# Patient Record
Sex: FEMALE | NOT HISPANIC OR LATINO | Employment: OTHER | URBAN - METROPOLITAN AREA
[De-identification: names, ages, dates, MRNs, and addresses within clinical notes are randomized per-mention and may not be internally consistent; named-entity substitution may affect disease eponyms.]

---

## 2020-10-05 ENCOUNTER — OFFICE VISIT (OUTPATIENT)
Dept: INTERNAL MEDICINE | Age: 85
End: 2020-10-05

## 2020-10-05 ENCOUNTER — TELEPHONE (OUTPATIENT)
Dept: INTERNAL MEDICINE | Age: 85
End: 2020-10-05

## 2020-10-05 VITALS
HEIGHT: 62 IN | DIASTOLIC BLOOD PRESSURE: 76 MMHG | HEART RATE: 85 BPM | BODY MASS INDEX: 22.1 KG/M2 | OXYGEN SATURATION: 98 % | WEIGHT: 120.1 LBS | SYSTOLIC BLOOD PRESSURE: 122 MMHG

## 2020-10-05 DIAGNOSIS — I48.91 ATRIAL FIBRILLATION, UNSPECIFIED TYPE (HCC): Primary | ICD-10-CM

## 2020-10-05 DIAGNOSIS — Z98.890 H/O CAROTID ENDARTERECTOMY: ICD-10-CM

## 2020-10-05 DIAGNOSIS — M81.0 OSTEOPOROSIS, UNSPECIFIED OSTEOPOROSIS TYPE, UNSPECIFIED PATHOLOGICAL FRACTURE PRESENCE: ICD-10-CM

## 2020-10-05 DIAGNOSIS — Z82.49 FAMILY HISTORY OF HEART DISEASE: ICD-10-CM

## 2020-10-05 DIAGNOSIS — Z83.3 FAMILY HISTORY OF DIABETES MELLITUS: ICD-10-CM

## 2020-10-05 DIAGNOSIS — I65.23 BILATERAL CAROTID ARTERY STENOSIS: ICD-10-CM

## 2020-10-05 PROCEDURE — 99204 OFFICE O/P NEW MOD 45 MIN: CPT | Performed by: NURSE PRACTITIONER

## 2020-10-05 RX ORDER — ASPIRIN 81 MG/1
81 TABLET ORAL DAILY
COMMUNITY
Start: 2020-10-05 | End: 2020-10-05 | Stop reason: ALTCHOICE

## 2020-10-05 RX ORDER — WARFARIN SODIUM 2.5 MG/1
2.5 TABLET ORAL NIGHTLY
Qty: 90 TABLET | Refills: 0 | Status: SHIPPED | OUTPATIENT
Start: 2020-10-05 | End: 2020-11-05 | Stop reason: DRUGHIGH

## 2020-10-05 NOTE — PROGRESS NOTES
[facility]  JOY Allen / 84 y.o. / female  10/05/2020      ASSESSMENT & PLAN:    Problem List Items Addressed This Visit     None      Visit Diagnoses     Atrial fibrillation, unspecified type (CMS/HCC)    -  Primary    Relevant Orders    CBC & Differential    Ambulatory Referral to Cardiology    Family history of heart disease        Relevant Orders    Ambulatory Referral to Cardiology    H/O carotid endarterectomy        Relevant Orders    Lipid Panel With / Chol / HDL Ratio    Ambulatory Referral to Cardiology    US Carotid Bilateral    Osteoporosis, unspecified osteoporosis type, unspecified pathological fracture presence        Relevant Medications    calcium carbonate-vitamin d (Calcium 600+D) 600-400 MG-UNIT per tablet    Other Relevant Orders    DEXA Bone Density Axial    Vitamin D 25 Hydroxy    Family history of diabetes mellitus        Relevant Orders    Comprehensive Metabolic Panel    Hemoglobin A1c    Bilateral carotid artery stenosis        Relevant Orders    Lipid Panel With / Chol / HDL Ratio    US Carotid Bilateral        Orders Placed This Encounter   Procedures   • DEXA Bone Density Axial   • US Carotid Bilateral   • Comprehensive Metabolic Panel   • Hemoglobin A1c   • Lipid Panel With / Chol / HDL Ratio   • Vitamin D 25 Hydroxy   • Ambulatory Referral to Cardiology   • CBC & Differential     New Medications Ordered This Visit   Medications   • calcium carbonate-vitamin d (Calcium 600+D) 600-400 MG-UNIT per tablet     Sig: Take 1 tablet by mouth Daily.     Dispense:          Summary/Discussion:    Patient overall is a poor historian cannot provide much detail to her medical history.  I did attempt to pull records from her previous PCP office in [place], but they are not on the epic system. We will attempt to obtain previous records from PCP and cardiologist.     1. Atrial fibrillation, unspecified type (CMS/HCC)  She had been on warfarin for some time  "for treatment of A. fib, but reports she stopped this on her own.  She has not had any palpitations, shortness of breath, swelling.  Strongly encourage cardiology referral for this in addition to her significantly strong family history of heart disease.  CHADS-2 score of 4, strong recommendation to restart anticoagulation. Will restart Coumadin at 2.5mg daily, INR recheck 1 week.     We did discuss risk factor reduction for stroke as this is a concern of hers.  I did discuss with patient that untreated atrial fib is certainly a strong risk factor and this is why she needs to be on anticoagulation.  We also discussed blood pressure regulation, evaluation for lipid disorders, and genetic risk.     - CBC & Differential  - Ambulatory Referral to Cardiology    2. Family history of heart disease    - Ambulatory Referral to Cardiology    3. H/O carotid endarterectomy    - Lipid Panel With / Chol / HDL Ratio    - Ambulatory Referral to Cardiology  - US Carotid Bilateral    4. Osteoporosis, unspecified osteoporosis type, unspecified pathological fracture presence  Start calcium and vitamin D replacement.  Will check vitamin D level today.  Needs to obtain updated bone density screening.    - DEXA Bone Density Axial  - Vitamin D 25 Hydroxy  - calcium carbonate-vitamin d (Calcium 600+D) 600-400 MG-UNIT per tablet; Take 1 tablet by mouth Daily.  Dispense:      5. Family history of diabetes mellitus    - Comprehensive Metabolic Panel  - Hemoglobin A1c    6. Bilateral carotid artery stenosis    - Lipid Panel With / Chol / HDL Ratio  - US Carotid Bilateral        Return in about 6 weeks (around 11/16/2020) for Next scheduled follow up.    ____________________________________________________________________    VITALS:    Visit Vitals  /76   Pulse 85   Ht 157.5 cm (62\")   Wt 54.5 kg (120 lb 1.6 oz)   SpO2 98%   BMI 21.97 kg/m²       BP Readings from Last 3 Encounters:   10/05/20 122/76     Wt Readings from Last 3 Encounters: " "  10/05/20 54.5 kg (120 lb 1.6 oz)      Body mass index is 21.97 kg/m².    CC: Main reason(s) for today's visit: Osteoporosis (Bone density test wanted ) and Establish Care      HPI:    Patient is a 84 y.o. female who is here to establish care. Recently moved from [place] about 2 weeks ago.     Patient reports previous history of \"irregular heart rhythm \".  She thinks she may have been diagnosed approximately 7 to 10 years ago.  She was seeing a cardiologist in [place] but it is been sometime since she has been seen.  She also reports that she stopped her warfarin on her own approximately 2 to 3 months ago because she ran out of medication and simply did not want to continue taking it.  She has a strong family history of cardiovascular disease.  She denies any personal history of congestive heart failure, CAD.  She does have a history of a left carotid endarterectomy done approximately 2017.  Denies any chest pain, palpitations.  Occasional shortness of breath.  She wants to discuss \"stroke prevention\" today.     She also has history of osteoporosis, unknown last DEXA scan. She thinks that she may have been on medication treatment previously, but stopped it after a few weeks.     She does report some transient \"black cloud\" across her right peripheral visual field at times. No syncope or dizziness.       Patient Care Team:  Melissa Sandoval APRN as PCP - General (Internal Medicine)  ____________________________________________________________________      REVIEW OF SYSTEMS    Review of Systems   Constitutional: Negative for activity change, appetite change and unexpected weight change.   HENT: Negative for tinnitus.    Eyes: Positive for visual disturbance.   Respiratory: Negative for cough, chest tightness and shortness of breath.    Cardiovascular: Negative for chest pain, palpitations and leg swelling.   Neurological: Negative for dizziness, light-headedness, numbness and headaches.       PHYSICAL " EXAMINATION    Physical Exam  Vitals signs and nursing note reviewed.   Constitutional:       General: She is not in acute distress.     Appearance: She is well-developed. She is not ill-appearing.   Cardiovascular:      Rate and Rhythm: Normal rate and regular rhythm.      Heart sounds: Normal heart sounds, S1 normal and S2 normal. No murmur.   Pulmonary:      Effort: Pulmonary effort is normal.      Breath sounds: Normal breath sounds. No decreased breath sounds, wheezing, rhonchi or rales.   Skin:     General: Skin is warm and dry.   Neurological:      Mental Status: She is alert and oriented to person, place, and time.   Psychiatric:         Speech: Speech normal.         Behavior: Behavior normal. Behavior is cooperative.         Thought Content: Thought content normal.         Judgment: Judgment normal.           REVIEWED DATA:    Labs:   No results found for: NA, K, AST, ALT, BUN, CREATININE, EGFRIFNONA, EGFRIFAFRI    No results found for: GLUCOSE, HGBA1C, MICROALBUR    No results found for: LDL, HDL, TRIG, CHOLHDLRATIO    No results found for: TSH, FREET4     No results found for: WBC, HGB, PLT      Imaging:        Medical Tests:        Summary of old records / correspondence / consultant report:        Request outside records:        ______________________________________________________________________    ALLERGIES  Allergies   Allergen Reactions   • Lipitor [Atorvastatin Calcium] Itching        MEDICATIONS  No current outpatient medications on file prior to visit.     No current facility-administered medications on file prior to visit.        PFSH:     The following portions of the patient's history were reviewed and updated as appropriate: Allergies / Current Medications / Past Medical History / Surgical History / Social History / Family History    PROBLEM LIST   There is no problem list on file for this patient.      PAST MEDICAL HISTORY  Past Medical History:   Diagnosis Date   • Atrial fibrillation  (CMS/[facility])    • Atrial fibrillation (CMS/[facility])    • Osteoporosis        SURGICAL HISTORY  Past Surgical History:   Procedure Laterality Date   • CAROTID ENDARTERECTOMY     • HIP SURGERY     • TONSILLECTOMY         SOCIAL HISTORY  Social History     Socioeconomic History   • Marital status: Single     Spouse name: Not on file   • Number of children: Not on file   • Years of education: Not on file   • Highest education level: Not on file   Tobacco Use   • Smoking status: Never Smoker   Substance and Sexual Activity   • Alcohol use: Yes     Frequency: Monthly or less     Drinks per session: 1 or 2     Binge frequency: Never     Comment: may 2 times a year   • Drug use: Defer   • Sexual activity: Defer       FAMILY HISTORY  Family History   Problem Relation Age of Onset   • Heart disease Mother    • Other Mother         low blood sugar   • Heart attack Mother    • Diabetes Father    • Heart attack Father    • Diabetes Brother    • Heart disease Brother    • Heart disease Brother    • Cancer Brother    • Breast cancer Neg Hx    • Colon cancer Neg Hx          **Dragon Disclaimer:   Much of this encounter note is an electronic transcription/translation of spoken language to printed text. The electronic translation of spoken language may permit erroneous, or at times, nonsensical words or phrases to be inadvertently transcribed. Although I have reviewed the note for such errors, some may still exist.

## 2020-10-05 NOTE — TELEPHONE ENCOUNTER
Contact patient.   Needs 1 week lab appt.     Please let her know that I would like to go ahead and restart her back on her warfarin/coumadin now. I am not sure how long it will take to get her into cardiology and I don't want her to wait due to increase risk of blood clots.     I will send in RX for 2.5mg daily every day.   She will need an appointment to have her PT/INR level checked here in 1 week.     DO NOT START ASPIRIN since we are starting her on warfarin.     We also need to know which pharmacy she wants to use so I can send the RX.

## 2020-10-06 ENCOUNTER — TELEPHONE (OUTPATIENT)
Dept: INTERNAL MEDICINE | Age: 85
End: 2020-10-06

## 2020-10-06 DIAGNOSIS — E78.5 HYPERLIPIDEMIA, UNSPECIFIED HYPERLIPIDEMIA TYPE: ICD-10-CM

## 2020-10-06 DIAGNOSIS — Z98.890 H/O CAROTID ENDARTERECTOMY: Primary | ICD-10-CM

## 2020-10-06 LAB
25(OH)D3+25(OH)D2 SERPL-MCNC: 33 NG/ML (ref 30–100)
ALBUMIN SERPL-MCNC: 4.7 G/DL (ref 3.5–5.2)
ALBUMIN/GLOB SERPL: 2.9 G/DL
ALP SERPL-CCNC: 83 U/L (ref 39–117)
ALT SERPL-CCNC: 10 U/L (ref 1–33)
AST SERPL-CCNC: 21 U/L (ref 1–32)
BASOPHILS # BLD AUTO: 0.06 10*3/MM3 (ref 0–0.2)
BASOPHILS NFR BLD AUTO: 0.7 % (ref 0–1.5)
BILIRUB SERPL-MCNC: 0.3 MG/DL (ref 0–1.2)
BUN SERPL-MCNC: 18 MG/DL (ref 8–23)
BUN/CREAT SERPL: 24.7 (ref 7–25)
CALCIUM SERPL-MCNC: 9.4 MG/DL (ref 8.6–10.5)
CHLORIDE SERPL-SCNC: 106 MMOL/L (ref 98–107)
CHOLEST SERPL-MCNC: 242 MG/DL (ref 0–200)
CHOLEST/HDLC SERPL: 2.99 {RATIO}
CO2 SERPL-SCNC: 30.9 MMOL/L (ref 22–29)
CREAT SERPL-MCNC: 0.73 MG/DL (ref 0.57–1)
EOSINOPHIL # BLD AUTO: 0.06 10*3/MM3 (ref 0–0.4)
EOSINOPHIL NFR BLD AUTO: 0.7 % (ref 0.3–6.2)
ERYTHROCYTE [DISTWIDTH] IN BLOOD BY AUTOMATED COUNT: 12.6 % (ref 12.3–15.4)
GLOBULIN SER CALC-MCNC: 1.6 GM/DL
GLUCOSE SERPL-MCNC: 76 MG/DL (ref 65–99)
HBA1C MFR BLD: 5.3 % (ref 4.8–5.6)
HCT VFR BLD AUTO: 42.9 % (ref 34–46.6)
HDLC SERPL-MCNC: 81 MG/DL (ref 40–60)
HGB BLD-MCNC: 14.5 G/DL (ref 12–15.9)
IMM GRANULOCYTES # BLD AUTO: 0.03 10*3/MM3 (ref 0–0.05)
IMM GRANULOCYTES NFR BLD AUTO: 0.3 % (ref 0–0.5)
LDLC SERPL CALC-MCNC: 147 MG/DL (ref 0–100)
LYMPHOCYTES # BLD AUTO: 1.59 10*3/MM3 (ref 0.7–3.1)
LYMPHOCYTES NFR BLD AUTO: 18.2 % (ref 19.6–45.3)
MCH RBC QN AUTO: 32.4 PG (ref 26.6–33)
MCHC RBC AUTO-ENTMCNC: 33.8 G/DL (ref 31.5–35.7)
MCV RBC AUTO: 96 FL (ref 79–97)
MONOCYTES # BLD AUTO: 0.62 10*3/MM3 (ref 0.1–0.9)
MONOCYTES NFR BLD AUTO: 7.1 % (ref 5–12)
NEUTROPHILS # BLD AUTO: 6.38 10*3/MM3 (ref 1.7–7)
NEUTROPHILS NFR BLD AUTO: 73 % (ref 42.7–76)
NRBC BLD AUTO-RTO: 0 /100 WBC (ref 0–0.2)
PLATELET # BLD AUTO: 270 10*3/MM3 (ref 140–450)
POTASSIUM SERPL-SCNC: 4.5 MMOL/L (ref 3.5–5.2)
PROT SERPL-MCNC: 6.3 G/DL (ref 6–8.5)
RBC # BLD AUTO: 4.47 10*6/MM3 (ref 3.77–5.28)
SODIUM SERPL-SCNC: 145 MMOL/L (ref 136–145)
TRIGL SERPL-MCNC: 68 MG/DL (ref 0–150)
VLDLC SERPL CALC-MCNC: 13.6 MG/DL
WBC # BLD AUTO: 8.74 10*3/MM3 (ref 3.4–10.8)

## 2020-10-06 RX ORDER — ROSUVASTATIN CALCIUM 10 MG/1
10 TABLET, COATED ORAL DAILY
Qty: 30 TABLET | Refills: 5 | Status: SHIPPED | OUTPATIENT
Start: 2020-10-06 | End: 2020-11-05 | Stop reason: SINTOL

## 2020-10-06 NOTE — TELEPHONE ENCOUNTER
----- Message from Cari Sanchez MA sent at 10/6/2020  2:05 PM EDT -----  Pt informed of lab results Via phone. Pt would Rx.

## 2020-10-07 ENCOUNTER — TELEPHONE (OUTPATIENT)
Dept: INTERNAL MEDICINE | Age: 85
End: 2020-10-07

## 2020-10-07 NOTE — TELEPHONE ENCOUNTER
Patient's son Edgar stated the pt did not fast for the labs done on 10/5/20. Wanted to know how this would affect the labs that were already completed?

## 2020-10-07 NOTE — TELEPHONE ENCOUNTER
Contact patient.     Non-fasting status does not appear to have much effect on her labs. Her blood sugar was fine.   Usually if patient is not fasting, triglycerides will be elevated. LDL usually not affected as much, so I would not recommend any changes to her medication recommendations.

## 2020-10-14 ENCOUNTER — ANTICOAGULATION VISIT (OUTPATIENT)
Dept: INTERNAL MEDICINE | Age: 85
End: 2020-10-14

## 2020-10-14 DIAGNOSIS — E78.5 HYPERLIPIDEMIA, UNSPECIFIED HYPERLIPIDEMIA TYPE: Primary | ICD-10-CM

## 2020-10-14 DIAGNOSIS — Z98.890 H/O CAROTID ENDARTERECTOMY: ICD-10-CM

## 2020-10-14 LAB — INR PPP: 1.2 (ref 2–3)

## 2020-10-14 PROCEDURE — 93793 ANTICOAG MGMT PT WARFARIN: CPT | Performed by: NURSE PRACTITIONER

## 2020-10-14 PROCEDURE — 85610 PROTHROMBIN TIME: CPT | Performed by: NURSE PRACTITIONER

## 2020-10-14 PROCEDURE — 36416 COLLJ CAPILLARY BLOOD SPEC: CPT | Performed by: NURSE PRACTITIONER

## 2020-10-14 RX ORDER — EZETIMIBE 10 MG/1
10 TABLET ORAL DAILY
Qty: 30 TABLET | Refills: 5 | Status: SHIPPED | OUTPATIENT
Start: 2020-10-14 | End: 2020-10-15

## 2020-10-14 NOTE — TELEPHONE ENCOUNTER
Contact patient.     RX sent for Zetia (ezetimibe) instead. Not as effective as statin at cholesterol reduction, but should be beneficial.

## 2020-10-14 NOTE — TELEPHONE ENCOUNTER
PT IS UNABLE TO TAKE STATINS. SHE HAS A REACTION.  SHE IS REQUESTING THAT YOU CALL SOMETHING ELSE IN. THEY DID NOT  THE RX YOU CALLED IN ALREADY. ANY QUESTIONS PLEASE CALL [phone]

## 2020-10-15 ENCOUNTER — OFFICE VISIT (OUTPATIENT)
Dept: CARDIOLOGY | Facility: CLINIC | Age: 85
End: 2020-10-15

## 2020-10-15 VITALS
DIASTOLIC BLOOD PRESSURE: 82 MMHG | BODY MASS INDEX: 22.08 KG/M2 | HEART RATE: 76 BPM | WEIGHT: 120 LBS | SYSTOLIC BLOOD PRESSURE: 110 MMHG | HEIGHT: 62 IN

## 2020-10-15 DIAGNOSIS — E78.2 MIXED HYPERLIPIDEMIA: ICD-10-CM

## 2020-10-15 DIAGNOSIS — I48.0 PAROXYSMAL ATRIAL FIBRILLATION (HCC): Primary | ICD-10-CM

## 2020-10-15 DIAGNOSIS — I65.22 CAROTID STENOSIS, ASYMPTOMATIC, LEFT: ICD-10-CM

## 2020-10-15 PROCEDURE — 93000 ELECTROCARDIOGRAM COMPLETE: CPT | Performed by: INTERNAL MEDICINE

## 2020-10-15 PROCEDURE — 99204 OFFICE O/P NEW MOD 45 MIN: CPT | Performed by: INTERNAL MEDICINE

## 2020-10-15 NOTE — PROGRESS NOTES
[facility] New Patient Office Note     Encounter Date:10/15/20  Patient:Anatoliy Allen  :1935  MRN:[id number]    Referring Provider: MAYRA Griffith    Consulted for: Atrial fibrillation and PVD    Chief Complaint:   Chief Complaint   Patient presents with   • Atrial Fibrillation     History of Presenting Illness:      Ms. Allen is a 84 y.o. woman with past medical history notable for paroxysmal atrial fibrillation on warfarin, carotid stenoses status post left carotid endarterectomy, mixed hyperlipidemia, and possible history of stroke who presents our office for initial evaluation and to establish with a cardiologist.  Patient recently moved to the area to be closer with her brother from [place].  Unfortunately most of her care has been out in [place] and we do not have records available to us at this moment.  Sounds like patient has a history of paroxysmal atrial fibrillation as well as a history of a carotid endarterectomy.  She had not been taking much in the way of medications that she was wanting to try and do things naturally but unfortunately her cholesterol was noted to be elevated when she established with her primary care physician earlier this month.  She was also restarted on Coumadin appropriately by her primary care physician.  She has been taking the Coumadin and has been doing fairly well without any issues with bleeding.  Sounds like her primary care physician started and is going to manage this.    With regards to her paroxysmal atrial fibrillation did she does have symptoms of palpitations that occur randomly.  They are somewhat infrequent in duration.  They are bothersome when they do occur.  They typically will only last for a few hours maybe once or twice a week.    Fortunately patient does not great with regards to giving vivid details of her past medical history.  Unclear if she has had a stroke or not.  She did have a left carotid endarterectomy per her  report.  From what she can tell me she has not had any other cardiac issues such as a myocardial infarction but this is a major concern for her as all of her family has had some form of a heart attack.  Fortunately she has not having any symptoms of chest discomfort or chest pain.      Review of Systems:  Review of Systems   Constitution: Negative.   HENT: Negative.    Eyes: Negative.    Cardiovascular: Positive for irregular heartbeat.   Respiratory: Negative.    Endocrine: Negative.    Hematologic/Lymphatic: Negative.    Skin: Negative.    Musculoskeletal: Negative.    Gastrointestinal: Negative.    Genitourinary: Negative.    Neurological: Negative.    Psychiatric/Behavioral: Positive for memory loss.   Allergic/Immunologic: Negative.        Prior to Admission medications    Medication Sig Start Date End Date Taking? Authorizing Provider   calcium carbonate-vitamin d (Calcium 600+D) 600-400 MG-UNIT per tablet Take 1 tablet by mouth Daily. 10/5/20   Melissa Sandoval APRN   ezetimibe (Zetia) 10 MG tablet Take 1 tablet by mouth Daily. 10/14/20   Melissa Sandoval APRN   rosuvastatin (Crestor) 10 MG tablet Take 1 tablet by mouth Daily. 10/6/20   Melissa Sandoval APRN   warfarin (Coumadin) 2.5 MG tablet Take 1 tablet by mouth Every Night. 10/5/20   Melissa Sandoval APRN       Allergies   Allergen Reactions   • Lipitor [Atorvastatin Calcium] Itching       Past Medical History:   Diagnosis Date   • Atrial fibrillation (CMS/HCC)    • Atrial fibrillation (CMS/HCC)    • Osteoporosis        Past Surgical History:   Procedure Laterality Date   • CAROTID ENDARTERECTOMY     • HIP SURGERY     • TONSILLECTOMY         Social History     Socioeconomic History   • Marital status: Single     Spouse name: Not on file   • Number of children: Not on file   • Years of education: Not on file   • Highest education level: Not on file   Tobacco Use   • Smoking status: Never Smoker   • Smokeless tobacco: Never Used   Substance and Sexual  "Activity   • Alcohol use: Yes     Frequency: Monthly or less     Drinks per session: 1 or 2     Binge frequency: Never     Comment: may 2 times a year/caffeine use    • Drug use: Defer   • Sexual activity: Defer       Family History   Problem Relation Age of Onset   • Heart disease Mother    • Other Mother         low blood sugar   • Heart attack Mother    • Diabetes Father    • Heart attack Father    • Diabetes Brother    • Heart disease Brother    • Heart disease Brother    • Cancer Brother    • Breast cancer Neg Hx    • Colon cancer Neg Hx        The following portions of the patient's history were reviewed and updated as appropriate: allergies, current medications, past family history, past medical history, past social history, past surgical history and problem list.       Objective:       Vitals:    10/15/20 1304   BP: 110/82   BP Location: Left arm   Patient Position: Sitting   Pulse: 76   Weight: 54.4 kg (120 lb)   Height: 157.5 cm (62\")       Physical Exam:  Constitutional: Well appearing, well developed, no acute distress   HENT: Oropharynx clear and membrane moist  Eyes: Normal conjunctiva, no sclera icterus.  Neck: Supple, no carotid bruit bilaterally.  Cardiovascular: Regular rate and rhythm, No Murmur, No bilateral lower extremity edema.  Pulmonary: Normal respiratory effort, normal lung sounds, no wheezing.  Abdominal: Soft, nontender, no hepatosplenomegaly, liver is non-pulsatile.  Neurological: Alert and orient x 3.   However seems somewhat forgetful regarding her past medical history  Skin: Warm, dry, no ecchymosis, no rash.  Psych: Appropriate mood and affect. Normal judgment and insight.      Lab Results   Component Value Date    BUN 18 10/05/2020    CREATININE 0.73 10/05/2020    EGFRIFNONA 76 10/05/2020    EGFRIFAFRI 92 10/05/2020    BCR 24.7 10/05/2020    K 4.5 10/05/2020    CO2 30.9 (H) 10/05/2020    CALCIUM 9.4 10/05/2020    PROTENTOTREF 6.3 10/05/2020    ALBUMIN 4.70 10/05/2020    LABIL2 2.9 " 10/05/2020    AST 21 10/05/2020    ALT 10 10/05/2020       Lab Results   Component Value Date    WBC 8.74 10/05/2020    HGB 14.5 10/05/2020    HCT 42.9 10/05/2020    MCV 96.0 10/05/2020     10/05/2020       No results found for: CKTOTAL, CKMB, CKMBINDEX, TROPONINI, TROPONINT    Lab Results   Component Value Date    CHLPL 242 (H) 10/05/2020     Lab Results   Component Value Date    TRIG 68 10/05/2020     Lab Results   Component Value Date    HDL 81 (H) 10/05/2020     Lab Results   Component Value Date     (H) 10/05/2020       No results found for: TSH      ECG 12 Lead    Date/Time: 10/15/2020 1:44 PM  Performed by: Raphael Lainez MD  Authorized by: Raphael Lainez MD   Previous ECG: no previous ECG available  Rhythm: sinus rhythm  Q waves: V1 and V2      Clinical impression: non-specific ECG              Assessment:          Diagnosis Plan   1. Paroxysmal atrial fibrillation (CMS/HCC)  Adult Transthoracic Echo Complete W/ Cont if Necessary Per Protocol    Holter Monitor - 24 Hour   2. Carotid stenosis, asymptomatic, left     3. Mixed hyperlipidemia            Plan:       Ms. Allen is a 84 y.o. woman with past medical history notable for paroxysmal atrial fibrillation on warfarin, carotid stenoses status post left carotid endarterectomy, mixed hyperlipidemia, and possible history of stroke who presents our office for initial evaluation and to establish with a cardiologist.  Fortunately patient simply doing fairly well with regards to her cardiac issues but is having some breakthrough palpitations.  I would like to get a Holter monitor just to evaluate for any overt severe arrhythmias and an echocardiogram to assess her heart for any structural normalities which may be contributing.  I need this to be a good starting point for her.  She is already started back on Coumadin which is being managed by her primary care physician.  She is somewhat forgetful and does have what seems to be some  transportation issues and might benefit from consideration of transitioning to a NOAC that she has some concerns over the cost and can continue with Coumadin as long as she is able to get appropriate INR levels.    Paroxysmal atrial fibrillation:  · We will get monitor to assess for any significant arrhythmias  · Follow-up echocardiogram  · Continue Coumadin as directed by primary care physician's office but could consider transitioning to novel oral anticoagulant if having issues maintaining therapeutic INRs    Carotid stenosis status post carotid endarterectomy:  · Continue aspirin  · Continue statin    Mixed hyperlipidemia:  · Continue statin therapy      Follow-up:  6 weeks work on obtaining records from primary cardiologist from before to get a better idea of what her prior work-up has been    Thank you for allowing me to participate in the care of Anatoliy Allen. Feel free to contact me directly with any further questions or concerns.    Raphael Lainez MD  [facility]  10/15/20  13:43 EDT

## 2020-10-20 ENCOUNTER — HOSPITAL ENCOUNTER (OUTPATIENT)
Dept: CARDIOLOGY | Facility: HOSPITAL | Age: 85
Discharge: HOME | End: 2020-10-20
Admitting: NURSE PRACTITIONER

## 2020-10-20 LAB
BH CV XLRA MEAS LEFT DIST CCA EDV: 21.4 CM/SEC
BH CV XLRA MEAS LEFT DIST CCA PSV: 74.1 CM/SEC
BH CV XLRA MEAS LEFT DIST ICA EDV: -22.5 CM/SEC
BH CV XLRA MEAS LEFT DIST ICA PSV: -86.2 CM/SEC
BH CV XLRA MEAS LEFT ICA/CCA RATIO: 1.16
BH CV XLRA MEAS LEFT MID ICA EDV: -23.6 CM/SEC
BH CV XLRA MEAS LEFT MID ICA PSV: -60.9 CM/SEC
BH CV XLRA MEAS LEFT PROX CCA EDV: 19.2 CM/SEC
BH CV XLRA MEAS LEFT PROX CCA PSV: 74.6 CM/SEC
BH CV XLRA MEAS LEFT PROX ECA EDV: 12 CM/SEC
BH CV XLRA MEAS LEFT PROX ECA PSV: 213 CM/SEC
BH CV XLRA MEAS LEFT PROX ICA EDV: -21.4 CM/SEC
BH CV XLRA MEAS LEFT PROX ICA PSV: -75.7 CM/SEC
BH CV XLRA MEAS LEFT PROX SCLA PSV: 118.7 CM/SEC
BH CV XLRA MEAS LEFT VERTEBRAL A EDV: -9.5 CM/SEC
BH CV XLRA MEAS LEFT VERTEBRAL A PSV: -44.8 CM/SEC
BH CV XLRA MEAS RIGHT DIST CCA EDV: 18.1 CM/SEC
BH CV XLRA MEAS RIGHT DIST CCA PSV: 81.2 CM/SEC
BH CV XLRA MEAS RIGHT DIST ICA EDV: -19.7 CM/SEC
BH CV XLRA MEAS RIGHT DIST ICA PSV: -66.8 CM/SEC
BH CV XLRA MEAS RIGHT ICA/CCA RATIO: 0.99
BH CV XLRA MEAS RIGHT MID ICA EDV: -18 CM/SEC
BH CV XLRA MEAS RIGHT MID ICA PSV: -80.1 CM/SEC
BH CV XLRA MEAS RIGHT PROX CCA EDV: 17.6 CM/SEC
BH CV XLRA MEAS RIGHT PROX CCA PSV: 82.9 CM/SEC
BH CV XLRA MEAS RIGHT PROX ECA EDV: -14 CM/SEC
BH CV XLRA MEAS RIGHT PROX ECA PSV: 134 CM/SEC
BH CV XLRA MEAS RIGHT PROX ICA EDV: 27.3 CM/SEC
BH CV XLRA MEAS RIGHT PROX ICA PSV: 80.1 CM/SEC
BH CV XLRA MEAS RIGHT PROX SCLA PSV: 94.6 CM/SEC
BH CV XLRA MEAS RIGHT VERTEBRAL A EDV: -8.8 CM/SEC
BH CV XLRA MEAS RIGHT VERTEBRAL A PSV: -34.2 CM/SEC
LEFT ARM BP: NORMAL MMHG
RIGHT ARM BP: NORMAL MMHG

## 2020-10-20 PROCEDURE — 93880 EXTRACRANIAL BILAT STUDY: CPT

## 2020-10-21 ENCOUNTER — FLU SHOT (OUTPATIENT)
Dept: INTERNAL MEDICINE | Age: 85
End: 2020-10-21

## 2020-10-21 ENCOUNTER — ANTICOAGULATION VISIT (OUTPATIENT)
Dept: INTERNAL MEDICINE | Age: 85
End: 2020-10-21

## 2020-10-21 DIAGNOSIS — Z23 FLU VACCINE NEED: Primary | ICD-10-CM

## 2020-10-21 LAB — INR PPP: 1.5 (ref 2–3)

## 2020-10-21 PROCEDURE — 90694 VACC AIIV4 NO PRSRV 0.5ML IM: CPT | Performed by: NURSE PRACTITIONER

## 2020-10-21 PROCEDURE — G0008 ADMIN INFLUENZA VIRUS VAC: HCPCS | Performed by: NURSE PRACTITIONER

## 2020-10-21 PROCEDURE — 36416 COLLJ CAPILLARY BLOOD SPEC: CPT | Performed by: NURSE PRACTITIONER

## 2020-10-21 PROCEDURE — 85610 PROTHROMBIN TIME: CPT | Performed by: NURSE PRACTITIONER

## 2020-10-28 ENCOUNTER — ANTICOAGULATION VISIT (OUTPATIENT)
Dept: INTERNAL MEDICINE | Age: 85
End: 2020-10-28

## 2020-10-28 DIAGNOSIS — I48.0 PAROXYSMAL ATRIAL FIBRILLATION (HCC): Primary | ICD-10-CM

## 2020-10-28 LAB — INR PPP: 3 (ref 2–3)

## 2020-10-28 PROCEDURE — 36416 COLLJ CAPILLARY BLOOD SPEC: CPT | Performed by: NURSE PRACTITIONER

## 2020-10-28 PROCEDURE — 85610 PROTHROMBIN TIME: CPT | Performed by: NURSE PRACTITIONER

## 2020-10-28 NOTE — PROGRESS NOTES
Pt informed to hold warfain for 24hrs starting tommorow then resume 5mg daily warfarin per dr candelaria then pt/inr check next week as appt 11/04/20

## 2020-11-02 ENCOUNTER — HOSPITAL ENCOUNTER (OUTPATIENT)
Dept: CARDIOLOGY | Facility: HOSPITAL | Age: 85
Discharge: HOME | End: 2020-11-02
Admitting: INTERNAL MEDICINE

## 2020-11-02 DIAGNOSIS — I48.0 PAROXYSMAL ATRIAL FIBRILLATION (HCC): ICD-10-CM

## 2020-11-02 LAB
AORTIC ARCH: 1.7 CM
BH CV ECHO MEAS - ACS: 1.7 CM
BH CV ECHO MEAS - AO MAX PG (FULL): 4 MMHG
BH CV ECHO MEAS - AO MAX PG: 6.6 MMHG
BH CV ECHO MEAS - AO MEAN PG (FULL): 2.2 MMHG
BH CV ECHO MEAS - AO MEAN PG: 3.4 MMHG
BH CV ECHO MEAS - AO V2 MAX: 128 CM/SEC
BH CV ECHO MEAS - AO V2 MEAN: 82.8 CM/SEC
BH CV ECHO MEAS - AO V2 VTI: 35.9 CM
BH CV ECHO MEAS - BSA(HAYCOCK): 1.5 M^2
BH CV ECHO MEAS - BSA: 1.5 M^2
BH CV ECHO MEAS - BZI_BMI: 21.9 KILOGRAMS/M^2
BH CV ECHO MEAS - BZI_METRIC_HEIGHT: 157.5 CM
BH CV ECHO MEAS - BZI_METRIC_WEIGHT: 54.4 KG
BH CV ECHO MEAS - EDV(MOD-SP2): 43 ML
BH CV ECHO MEAS - EDV(MOD-SP4): 35 ML
BH CV ECHO MEAS - EDV(TEICH): 115.8 ML
BH CV ECHO MEAS - EF(CUBED): 86 %
BH CV ECHO MEAS - EF(MOD-BP): 51.6 %
BH CV ECHO MEAS - EF(MOD-SP2): 53.5 %
BH CV ECHO MEAS - EF(MOD-SP4): 51.4 %
BH CV ECHO MEAS - EF(TEICH): 79.3 %
BH CV ECHO MEAS - ESV(MOD-SP2): 20 ML
BH CV ECHO MEAS - ESV(MOD-SP4): 17 ML
BH CV ECHO MEAS - ESV(TEICH): 24 ML
BH CV ECHO MEAS - FS: 48.1 %
BH CV ECHO MEAS - IVS/LVPW: 1.1
BH CV ECHO MEAS - IVSD: 1.1 CM
BH CV ECHO MEAS - LAT PEAK E' VEL: 8.2 CM/SEC
BH CV ECHO MEAS - LV DIASTOLIC VOL/BSA (35-75): 22.7 ML/M^2
BH CV ECHO MEAS - LV MASS(C)D: 188.3 GRAMS
BH CV ECHO MEAS - LV MASS(C)DI: 122.4 GRAMS/M^2
BH CV ECHO MEAS - LV MAX PG: 2.6 MMHG
BH CV ECHO MEAS - LV MEAN PG: 1.2 MMHG
BH CV ECHO MEAS - LV SYSTOLIC VOL/BSA (12-30): 11 ML/M^2
BH CV ECHO MEAS - LV V1 MAX: 80 CM/SEC
BH CV ECHO MEAS - LV V1 MEAN: 48.3 CM/SEC
BH CV ECHO MEAS - LV V1 VTI: 24 CM
BH CV ECHO MEAS - LVIDD: 5 CM
BH CV ECHO MEAS - LVIDS: 2.6 CM
BH CV ECHO MEAS - LVLD AP2: 6.8 CM
BH CV ECHO MEAS - LVLD AP4: 6.4 CM
BH CV ECHO MEAS - LVLS AP2: 5.5 CM
BH CV ECHO MEAS - LVLS AP4: 6.2 CM
BH CV ECHO MEAS - LVPWD: 1 CM
BH CV ECHO MEAS - MED PEAK E' VEL: 7.7 CM/SEC
BH CV ECHO MEAS - MR MAX PG: 40.4 MMHG
BH CV ECHO MEAS - MR MAX VEL: 317.6 CM/SEC
BH CV ECHO MEAS - MV A DUR: 0.14 SEC
BH CV ECHO MEAS - MV A MAX VEL: 99 CM/SEC
BH CV ECHO MEAS - MV DEC SLOPE: 260 CM/SEC^2
BH CV ECHO MEAS - MV DEC TIME: 0.26 SEC
BH CV ECHO MEAS - MV E MAX VEL: 70.3 CM/SEC
BH CV ECHO MEAS - MV E/A: 0.71
BH CV ECHO MEAS - MV MAX PG: 5.7 MMHG
BH CV ECHO MEAS - MV MEAN PG: 2.2 MMHG
BH CV ECHO MEAS - MV P1/2T MAX VEL: 71.8 CM/SEC
BH CV ECHO MEAS - MV P1/2T: 80.9 MSEC
BH CV ECHO MEAS - MV V2 MAX: 119.4 CM/SEC
BH CV ECHO MEAS - MV V2 MEAN: 68.8 CM/SEC
BH CV ECHO MEAS - MV V2 VTI: 26.2 CM
BH CV ECHO MEAS - MVA P1/2T LCG: 3.1 CM^2
BH CV ECHO MEAS - MVA(P1/2T): 2.7 CM^2
BH CV ECHO MEAS - PA MAX PG (FULL): 1 MMHG
BH CV ECHO MEAS - PA MAX PG: 2.3 MMHG
BH CV ECHO MEAS - PA V2 MAX: 76 CM/SEC
BH CV ECHO MEAS - PULM A REVS DUR: 0.11 SEC
BH CV ECHO MEAS - PULM A REVS VEL: 26.1 CM/SEC
BH CV ECHO MEAS - PULM DIAS VEL: 30.4 CM/SEC
BH CV ECHO MEAS - PULM S/D: 1.8
BH CV ECHO MEAS - PULM SYS VEL: 55.3 CM/SEC
BH CV ECHO MEAS - PVA(V,A): 2.6 CM^2
BH CV ECHO MEAS - PVA(V,D): 2.6 CM^2
BH CV ECHO MEAS - RAP SYSTOLE: 8 MMHG
BH CV ECHO MEAS - RV MAX PG: 1.3 MMHG
BH CV ECHO MEAS - RV MEAN PG: 0.81 MMHG
BH CV ECHO MEAS - RV V1 MAX: 57 CM/SEC
BH CV ECHO MEAS - RV V1 MEAN: 42.4 CM/SEC
BH CV ECHO MEAS - RV V1 VTI: 13.3 CM
BH CV ECHO MEAS - RVOT AREA: 3.4 CM^2
BH CV ECHO MEAS - RVOT DIAM: 2.1 CM
BH CV ECHO MEAS - RVSP: 29 MMHG
BH CV ECHO MEAS - SI(CUBED): 68 ML/M^2
BH CV ECHO MEAS - SI(MOD-SP2): 14.9 ML/M^2
BH CV ECHO MEAS - SI(MOD-SP4): 11.7 ML/M^2
BH CV ECHO MEAS - SI(TEICH): 59.7 ML/M^2
BH CV ECHO MEAS - SUP REN AO DIAM: 1.3 CM
BH CV ECHO MEAS - SV(CUBED): 104.6 ML
BH CV ECHO MEAS - SV(MOD-SP2): 23 ML
BH CV ECHO MEAS - SV(MOD-SP4): 18 ML
BH CV ECHO MEAS - SV(RVOT): 45.2 ML
BH CV ECHO MEAS - SV(TEICH): 91.8 ML
BH CV ECHO MEAS - TAPSE (>1.6): 2.8 CM
BH CV ECHO MEAS - TR MAX VEL: 234.1 CM/SEC
BH CV ECHO MEASUREMENTS AVERAGE E/E' RATIO: 8.84
BH CV XLRA - RV BASE: 3.7 CM
BH CV XLRA - RV LENGTH: 6.6 CM
BH CV XLRA - RV MID: 3.2 CM
BH CV XLRA - TDI S': 11.1 CM/SEC
LEFT ATRIUM VOLUME INDEX: 27 ML/M2
MAXIMAL PREDICTED HEART RATE: 137 BPM
SINUS: 3.5 CM
STRESS TARGET HR: 116 BPM

## 2020-11-02 PROCEDURE — 93356 MYOCRD STRAIN IMG SPCKL TRCK: CPT | Performed by: INTERNAL MEDICINE

## 2020-11-02 PROCEDURE — 93306 TTE W/DOPPLER COMPLETE: CPT | Performed by: INTERNAL MEDICINE

## 2020-11-02 PROCEDURE — 93356 MYOCRD STRAIN IMG SPCKL TRCK: CPT

## 2020-11-02 PROCEDURE — 93306 TTE W/DOPPLER COMPLETE: CPT

## 2020-11-05 ENCOUNTER — ANTICOAGULATION VISIT (OUTPATIENT)
Dept: INTERNAL MEDICINE | Age: 85
End: 2020-11-05

## 2020-11-05 ENCOUNTER — OFFICE VISIT (OUTPATIENT)
Dept: INTERNAL MEDICINE | Age: 85
End: 2020-11-05

## 2020-11-05 VITALS
OXYGEN SATURATION: 97 % | SYSTOLIC BLOOD PRESSURE: 110 MMHG | BODY MASS INDEX: 22.26 KG/M2 | HEIGHT: 62 IN | HEART RATE: 83 BPM | TEMPERATURE: 97.7 F | DIASTOLIC BLOOD PRESSURE: 80 MMHG | RESPIRATION RATE: 16 BRPM | WEIGHT: 121 LBS

## 2020-11-05 DIAGNOSIS — T14.8XXA BRUISING: ICD-10-CM

## 2020-11-05 DIAGNOSIS — J02.9 SORE THROAT: ICD-10-CM

## 2020-11-05 DIAGNOSIS — I48.0 PAROXYSMAL ATRIAL FIBRILLATION (HCC): Primary | ICD-10-CM

## 2020-11-05 LAB
INR PPP: 1.9 (ref 0.9–1.1)
INR PPP: 1.9 (ref 2–3)

## 2020-11-05 PROCEDURE — 85610 PROTHROMBIN TIME: CPT | Performed by: NURSE PRACTITIONER

## 2020-11-05 PROCEDURE — 99214 OFFICE O/P EST MOD 30 MIN: CPT | Performed by: NURSE PRACTITIONER

## 2020-11-05 PROCEDURE — 36416 COLLJ CAPILLARY BLOOD SPEC: CPT | Performed by: NURSE PRACTITIONER

## 2020-11-05 RX ORDER — EZETIMIBE 10 MG/1
10 TABLET ORAL DAILY
COMMUNITY
End: 2021-03-09

## 2020-11-05 RX ORDER — FLUTICASONE PROPIONATE 50 MCG
1 SPRAY, SUSPENSION (ML) NASAL DAILY
COMMUNITY
Start: 2020-11-05 | End: 2021-03-04

## 2020-11-05 RX ORDER — WARFARIN SODIUM 5 MG/1
TABLET ORAL
Qty: 90 TABLET | Refills: 1 | Status: SHIPPED | OUTPATIENT
Start: 2020-11-05 | End: 2020-11-19 | Stop reason: ALTCHOICE

## 2020-11-05 NOTE — PROGRESS NOTES
[facility] [place] [facility]NAL MEDICINE  Melissa Trevinoos / 84 y.o. / female  2020      ASSESSMENT & PLAN:    Problem List Items Addressed This Visit        Cardiovascular and Mediastinum    Paroxysmal atrial fibrillation (CMS/HCC) - Primary    Relevant Medications    warfarin (Coumadin) 5 MG tablet      Other Visit Diagnoses     Sore throat        Relevant Medications    fluticasone (Flonase) 50 MCG/ACT nasal spray    Bruising            Orders Placed This Encounter   Procedures   • POC INR     New Medications Ordered This Visit   Medications   • warfarin (Coumadin) 5 MG tablet     Sig: TAKE 5 MG (1 TABLET) ON MON, TUES, WED, FRI, SAT.  TAKE 7.5MG (1.5 TABLETS) ON THURS, SUN     Dispense:  90 tablet     Refill:  1   • fluticasone (Flonase) 50 MCG/ACT nasal spray     Si spray into the nostril(s) as directed by provider Daily.       Summary/Discussion:    1. Paroxysmal atrial fibrillation (CMS/HCC)  INR subtherapeutic at 1.9 today.  Discussed with patient change dosage to 5 mg on Monday, Tuesday, Wednesday, Friday, Saturday and 7.5 mg on Thursday and .  She was advised as to this dosage at her last check, but patient reports she is only been taking 5 mg every day.  Will have her return in one week for INR check.     - warfarin (Coumadin) 5 MG tablet; TAKE 5 MG (1 TABLET) ON MON, TUES, WED, FRI, SAT.  TAKE 7.5MG (1.5 TABLETS) ON THURS, SUN  Dispense: 90 tablet; Refill: 1    2. Sore throat  Suspect that recent intermittent sore throat is likely related to postnasal drainage and/or allergies.  Patient is new to this area from [place] and has not had symptoms like this before.    - fluticasone (Flonase) 50 MCG/ACT nasal spray; 1 spray into the nostril(s) as directed by provider Daily.    3. Bruising  Discussed with patient that small lesions on hands are bruises and related to endocoagulation.  She does not have any other significant bruising.  Platelet count at last office visit was within normal  "range.  Advised her to continue to monitor the specific areas of concern and if they do not resolve in the next few weeks, follow-up as needed.         Return for 1 week PT/INR, 4 month follow up visit .  ____________________________________________________________________    MEDICATIONS  Current Outpatient Medications   Medication Sig Dispense Refill   • calcium carbonate-vitamin d (Calcium 600+D) 600-400 MG-UNIT per tablet Take 1 tablet by mouth Daily.     • ezetimibe (ZETIA) 10 MG tablet Take 10 mg by mouth Daily.     • MAGNESIUM PO Take  by mouth.     • ASPIRIN 81 PO Take  by mouth.     • fluticasone (Flonase) 50 MCG/ACT nasal spray 1 spray into the nostril(s) as directed by provider Daily.     • warfarin (Coumadin) 5 MG tablet TAKE 5 MG (1 TABLET) ON MON, TUES, WED, FRI, SAT.  TAKE 7.5MG (1.5 TABLETS) ON THURS, SUN 90 tablet 1     No current facility-administered medications for this visit.        VITALS    Visit Vitals  /80   Pulse 83   Temp 97.7 °F (36.5 °C)   Resp 16   Ht 157.5 cm (62\")   Wt 54.9 kg (121 lb)   SpO2 97%   BMI 22.13 kg/m²       BP Readings from Last 3 Encounters:   11/05/20 110/80   10/15/20 110/82   10/05/20 122/76     Wt Readings from Last 3 Encounters:   11/05/20 54.9 kg (121 lb)   10/15/20 54.4 kg (120 lb)   10/05/20 54.5 kg (120 lb 1.6 oz)      Body mass index is 22.13 kg/m².    CC:  Main reason(s) for today's visit: Follow-up for hyperlipidemia, INR management , sore throat      HPI:     Chronic hyperlipidemia:  Current therapy include Zetia.    Most recent labs:   Lab Results   Component Value Date     (H) 10/05/2020    HDL 81 (H) 10/05/2020    TRIG 68 10/05/2020    CHOLHDLRATIO 2.99 10/05/2020        Sore Throat: Patient complains of sore throat. Associated symptoms include sore throat.Onset of symptoms was several weeks ago, intermittent since that time. She is drinking plenty of fluids. She has not had recent close exposure to someone with proven streptococcal " "pharyngitis.     She is also concerned about some new \"skin lesions \"to back of left hand.  She has noticed these for the past few weeks.  The areas are purplish in color. She denies any other significant bruising elsewhere on the body.     Patient Care Team:  Melissa Sandoval APRN as PCP - General (Internal Medicine)  ____________________________________________________________________      REVIEW OF SYSTEMS    Review of Systems   Constitutional: Negative for activity change, appetite change and unexpected weight change.   HENT: Positive for postnasal drip and sore throat. Negative for tinnitus.    Eyes: Negative for visual disturbance.   Respiratory: Negative for cough, chest tightness and shortness of breath.    Cardiovascular: Negative for chest pain, palpitations and leg swelling.   Skin: Positive for rash.   Neurological: Negative for dizziness, light-headedness and headaches.         PHYSICAL EXAMINATION    Physical Exam  Vitals signs and nursing note reviewed.   Constitutional:       General: She is not in acute distress.     Appearance: She is well-developed. She is not ill-appearing.   Cardiovascular:      Rate and Rhythm: Normal rate and regular rhythm.      Heart sounds: Normal heart sounds, S1 normal and S2 normal. No murmur.   Pulmonary:      Effort: Pulmonary effort is normal.      Breath sounds: Normal breath sounds. No decreased breath sounds, wheezing, rhonchi or rales.   Skin:     General: Skin is warm and dry.      Comments: Scattered small bruises to dorsal surfaces both hands    Neurological:      Mental Status: She is alert and oriented to person, place, and time.   Psychiatric:         Speech: Speech normal.         Behavior: Behavior normal. Behavior is cooperative.         Thought Content: Thought content normal.         Judgment: Judgment normal.         REVIEWED DATA:    Labs:   Lab Results   Component Value Date     10/05/2020    K 4.5 10/05/2020    AST 21 10/05/2020    ALT 10 " 10/05/2020    BUN 18 10/05/2020    CREATININE 0.73 10/05/2020    EGFRIFNONA 76 10/05/2020    EGFRIFAFRI 92 10/05/2020       Lab Results   Component Value Date    HGBA1C 5.30 10/05/2020       Lab Results   Component Value Date     (H) 10/05/2020    HDL 81 (H) 10/05/2020    TRIG 68 10/05/2020    CHOLHDLRATIO 2.99 10/05/2020       No results found for: TSH, FREET4       Lab Results   Component Value Date    WBC 8.74 10/05/2020    HGB 14.5 10/05/2020     10/05/2020       No results found for: PROTEIN, GLUCOSEU, BLOODU, NITRITEU, LEUKOCYTESUR    Imaging:        Medical Tests:        Summary of old records / correspondence / consultant report:        Request outside records:        ALLERGIES  Allergies   Allergen Reactions   • Lipitor [Atorvastatin Calcium] Itching        PFSH:     The following portions of the patient's history were reviewed and updated as appropriate: Allergies / Current Medications / Past Medical History / Surgical History / Social History / Family History    PROBLEM LIST   Patient Active Problem List   Diagnosis   • Paroxysmal atrial fibrillation (CMS/HCC)   • Carotid stenosis, asymptomatic, left   • Mixed hyperlipidemia       PAST MEDICAL HISTORY  Past Medical History:   Diagnosis Date   • Atrial fibrillation (CMS/HCC)    • Atrial fibrillation (CMS/HCC)    • Osteoporosis        SURGICAL HISTORY  Past Surgical History:   Procedure Laterality Date   • CAROTID ENDARTERECTOMY     • HIP SURGERY     • TONSILLECTOMY         SOCIAL HISTORY  Social History     Socioeconomic History   • Marital status: Single     Spouse name: Not on file   • Number of children: Not on file   • Years of education: Not on file   • Highest education level: Not on file   Tobacco Use   • Smoking status: Never Smoker   • Smokeless tobacco: Never Used   Substance and Sexual Activity   • Alcohol use: Yes     Frequency: Monthly or less     Drinks per session: 1 or 2     Binge frequency: Never     Comment: may 2 times a  year/caffeine use    • Drug use: Defer   • Sexual activity: Defer       FAMILY HISTORY  Family History   Problem Relation Age of Onset   • Heart disease Mother    • Other Mother         low blood sugar   • Heart attack Mother    • Diabetes Father    • Heart attack Father    • Diabetes Brother    • Heart disease Brother    • Heart disease Brother    • Cancer Brother    • Breast cancer Neg Hx    • Colon cancer Neg Hx

## 2020-11-12 ENCOUNTER — ANTICOAGULATION VISIT (OUTPATIENT)
Dept: INTERNAL MEDICINE | Age: 85
End: 2020-11-12

## 2020-11-12 DIAGNOSIS — I48.0 PAROXYSMAL ATRIAL FIBRILLATION (HCC): Primary | ICD-10-CM

## 2020-11-12 LAB — INR PPP: 1.6 (ref 0.9–1.1)

## 2020-11-12 PROCEDURE — 85610 PROTHROMBIN TIME: CPT | Performed by: NURSE PRACTITIONER

## 2020-11-12 PROCEDURE — 36416 COLLJ CAPILLARY BLOOD SPEC: CPT | Performed by: NURSE PRACTITIONER

## 2020-11-12 NOTE — PROGRESS NOTES
Pt to recheck pt/inr 1 week resume 5mg on Monday Wenesday Friday 7.5mg all other days pt informed

## 2020-11-19 ENCOUNTER — TELEPHONE (OUTPATIENT)
Dept: INTERNAL MEDICINE | Age: 85
End: 2020-11-19

## 2020-11-19 ENCOUNTER — OFFICE VISIT (OUTPATIENT)
Dept: INTERNAL MEDICINE | Age: 85
End: 2020-11-19

## 2020-11-19 VITALS
DIASTOLIC BLOOD PRESSURE: 70 MMHG | HEART RATE: 79 BPM | TEMPERATURE: 98 F | BODY MASS INDEX: 22.63 KG/M2 | SYSTOLIC BLOOD PRESSURE: 120 MMHG | OXYGEN SATURATION: 99 % | HEIGHT: 62 IN | WEIGHT: 123 LBS

## 2020-11-19 DIAGNOSIS — E78.2 MIXED HYPERLIPIDEMIA: ICD-10-CM

## 2020-11-19 DIAGNOSIS — I48.0 PAROXYSMAL ATRIAL FIBRILLATION (HCC): Primary | ICD-10-CM

## 2020-11-19 DIAGNOSIS — J02.9 SORE THROAT: ICD-10-CM

## 2020-11-19 DIAGNOSIS — I65.22 CAROTID STENOSIS, ASYMPTOMATIC, LEFT: ICD-10-CM

## 2020-11-19 LAB — INR PPP: 1.9 (ref 2–3)

## 2020-11-19 PROCEDURE — 36416 COLLJ CAPILLARY BLOOD SPEC: CPT | Performed by: NURSE PRACTITIONER

## 2020-11-19 PROCEDURE — 85610 PROTHROMBIN TIME: CPT | Performed by: NURSE PRACTITIONER

## 2020-11-19 PROCEDURE — 99214 OFFICE O/P EST MOD 30 MIN: CPT | Performed by: NURSE PRACTITIONER

## 2020-11-19 NOTE — PATIENT INSTRUCTIONS
START FLUTICASONE NASAL SPRAY 1 SPRAY BOTH NOSTRILS ONCE DAILY.     START TAKING OVER THE COUNTER ANTIHISTAMINE (LORATADINE, FEXOFENADINE, OR CETIRIZINE).

## 2020-11-19 NOTE — TELEPHONE ENCOUNTER
PATIENT STATES: that she would like to have the report from her appt 11/19/2020 mailed to her please advise     PATIENT CAN BE REACHED ON:[phone]

## 2020-11-19 NOTE — PROGRESS NOTES
[facility] [place] [facility]  Melissa Trevinoos / 84 y.o. / female  11/19/2020      ASSESSMENT & PLAN:    Problem List Items Addressed This Visit        Cardiovascular and Mediastinum    Paroxysmal atrial fibrillation (CMS/HCC) - Primary    Relevant Medications    apixaban (ELIQUIS) 2.5 MG tablet tablet    Carotid stenosis, asymptomatic, left    Mixed hyperlipidemia    Relevant Medications    ezetimibe (ZETIA) 10 MG tablet        Orders Placed This Encounter   Procedures   • POC INR     New Medications Ordered This Visit   Medications   • apixaban (ELIQUIS) 2.5 MG tablet tablet     Sig: Take 1 tablet by mouth Every 12 (Twelve) Hours.     Dispense:  60 tablet     Refill:  5       Summary/Discussion:    1. Paroxysmal atrial fibrillation (CMS/HCC)  INR suboptimal today.  There is some question of whether or not patient is reliably taking her medication and at the correct dosages, I am concerned about her compliance.  I advised her would recommend switching to Eliquis if this is cost effective. Samples for 7 days provided. Advised patient to start tonight.   Notify me if cost prohibitive and will switch back to warfarin as needed.     - apixaban (ELIQUIS) 2.5 MG tablet tablet; Take 1 tablet by mouth Every 12 (Twelve) Hours.  Dispense: 60 tablet; Refill: 5    2. Carotid stenosis, asymptomatic, left      3. Mixed hyperlipidemia  Check FLP next office visit.     4. Sore throat   Complaints of ongoing sore throat with hoarseness.  She did not start fluticasone and antihistamine as previously discussed.  Advised she give this at least a 2-week trial.  If no improvement in symptoms, may need to consider referral to ENT.     No follow-ups on file.  ____________________________________________________________________    MEDICATIONS  Current Outpatient Medications   Medication Sig Dispense Refill   • ASPIRIN 81 PO Take  by mouth.     • calcium carbonate-vitamin d (Calcium 600+D) 600-400 MG-UNIT per tablet Take 1  "tablet by mouth Daily.     • fluticasone (Flonase) 50 MCG/ACT nasal spray 1 spray into the nostril(s) as directed by provider Daily.     • MAGNESIUM PO Take  by mouth.     • apixaban (ELIQUIS) 2.5 MG tablet tablet Take 1 tablet by mouth Every 12 (Twelve) Hours. 60 tablet 5   • ezetimibe (ZETIA) 10 MG tablet Take 10 mg by mouth Daily.       No current facility-administered medications for this visit.        VITALS    Visit Vitals  /70   Pulse 79   Temp 98 °F (36.7 °C) (Temporal)   Ht 157.5 cm (62.01\")   Wt 55.8 kg (123 lb)   SpO2 99%   BMI 22.49 kg/m²       BP Readings from Last 3 Encounters:   11/19/20 120/70   11/05/20 110/80   10/15/20 110/82     Wt Readings from Last 3 Encounters:   11/19/20 55.8 kg (123 lb)   11/05/20 54.9 kg (121 lb)   10/15/20 54.4 kg (120 lb)      Body mass index is 22.49 kg/m².    CC:  Main reason(s) for today's visit: Follow-up for hyperlipidemia, INR management     HPI:     Chronic hyperlipidemia:  Current therapy include Zetia.    Most recent labs:   Lab Results   Component Value Date     (H) 10/05/2020    HDL 81 (H) 10/05/2020    TRIG 68 10/05/2020    CHOLHDLRATIO 2.99 10/05/2020        Patient currently on warfarin for paroxysmal atrial fibrillation.  There are some question as to whether or not she is currently taking the correct dosage.  She reports that her family member at home is responsible for filling her medication box.     She also reports episodic hoarseness of voice after prolonged talking. She has not yet tried NCS spray or antihistamine that was discussed last office visit.     Patient Care Team:  Melissa Sandoval APRN as PCP - General (Internal Medicine)  ____________________________________________________________________      REVIEW OF SYSTEMS    Review of Systems   Constitutional: Negative for activity change, appetite change and unexpected weight change.   HENT: Negative for tinnitus.    Eyes: Negative for visual disturbance.   Respiratory: Negative for " cough, chest tightness and shortness of breath.    Cardiovascular: Negative for chest pain, palpitations and leg swelling.   Neurological: Negative for dizziness, light-headedness and headaches.   Psychiatric/Behavioral: Positive for decreased concentration.         PHYSICAL EXAMINATION    Physical Exam  Vitals signs and nursing note reviewed.   Constitutional:       General: She is not in acute distress.     Appearance: She is well-developed. She is not ill-appearing.   Neurological:      Mental Status: She is alert and oriented to person, place, and time.   Psychiatric:         Behavior: Behavior is cooperative.         REVIEWED DATA:    Labs:   Lab Results   Component Value Date     10/05/2020    K 4.5 10/05/2020    AST 21 10/05/2020    ALT 10 10/05/2020    BUN 18 10/05/2020    CREATININE 0.73 10/05/2020    EGFRIFNONA 76 10/05/2020    EGFRIFAFRI 92 10/05/2020       Lab Results   Component Value Date    HGBA1C 5.30 10/05/2020       Lab Results   Component Value Date     (H) 10/05/2020    HDL 81 (H) 10/05/2020    TRIG 68 10/05/2020    CHOLHDLRATIO 2.99 10/05/2020       No results found for: TSH, FREET4       Lab Results   Component Value Date    WBC 8.74 10/05/2020    HGB 14.5 10/05/2020     10/05/2020       No results found for: PROTEIN, GLUCOSEU, BLOODU, NITRITEU, LEUKOCYTESUR    Imaging:        Medical Tests:        Summary of old records / correspondence / consultant report:        Request outside records:        ALLERGIES  Allergies   Allergen Reactions   • Lipitor [Atorvastatin Calcium] Itching        PFSH:     The following portions of the patient's history were reviewed and updated as appropriate: Allergies / Current Medications / Past Medical History / Surgical History / Social History / Family History    PROBLEM LIST   Patient Active Problem List   Diagnosis   • Paroxysmal atrial fibrillation (CMS/HCC)   • Carotid stenosis, asymptomatic, left   • Mixed hyperlipidemia       PAST MEDICAL  HISTORY  Past Medical History:   Diagnosis Date   • Atrial fibrillation (CMS/HCC)    • Atrial fibrillation (CMS/HCC)    • Osteoporosis        SURGICAL HISTORY  Past Surgical History:   Procedure Laterality Date   • CAROTID ENDARTERECTOMY     • HIP SURGERY     • TONSILLECTOMY         SOCIAL HISTORY  Social History     Socioeconomic History   • Marital status: Single     Spouse name: Not on file   • Number of children: Not on file   • Years of education: Not on file   • Highest education level: Not on file   Tobacco Use   • Smoking status: Never Smoker   • Smokeless tobacco: Never Used   Substance and Sexual Activity   • Alcohol use: Yes     Frequency: Monthly or less     Drinks per session: 1 or 2     Binge frequency: Never     Comment: may 2 times a year/caffeine use    • Drug use: Defer   • Sexual activity: Defer       FAMILY HISTORY  Family History   Problem Relation Age of Onset   • Heart disease Mother    • Other Mother         low blood sugar   • Heart attack Mother    • Diabetes Father    • Heart attack Father    • Diabetes Brother    • Heart disease Brother    • Heart disease Brother    • Cancer Brother    • Breast cancer Neg Hx    • Colon cancer Neg Hx

## 2020-12-03 ENCOUNTER — OFFICE VISIT (OUTPATIENT)
Dept: CARDIOLOGY | Facility: CLINIC | Age: 85
End: 2020-12-03

## 2020-12-03 VITALS
HEART RATE: 69 BPM | DIASTOLIC BLOOD PRESSURE: 88 MMHG | WEIGHT: 126.4 LBS | SYSTOLIC BLOOD PRESSURE: 146 MMHG | HEIGHT: 62 IN | BODY MASS INDEX: 23.26 KG/M2

## 2020-12-03 DIAGNOSIS — E78.2 MIXED HYPERLIPIDEMIA: ICD-10-CM

## 2020-12-03 DIAGNOSIS — I65.22 CAROTID STENOSIS, ASYMPTOMATIC, LEFT: ICD-10-CM

## 2020-12-03 DIAGNOSIS — I48.0 PAROXYSMAL ATRIAL FIBRILLATION (HCC): Primary | ICD-10-CM

## 2020-12-03 PROCEDURE — 99214 OFFICE O/P EST MOD 30 MIN: CPT | Performed by: INTERNAL MEDICINE

## 2020-12-03 PROCEDURE — 93000 ELECTROCARDIOGRAM COMPLETE: CPT | Performed by: INTERNAL MEDICINE

## 2020-12-03 NOTE — PROGRESS NOTES
[facility] Follow Up Office Note     Encounter Date:20  Patient:Anatoliy Tinsley  :1935  MRN:[id number]      Chief Complaint:   Chief Complaint   Patient presents with   • Atrial Fibrillation     6 week f/u   • Hyperlipidemia     History of Presenting Illness:      Ms. Allen is a 84 y.o. woman with past medical history notable for paroxysmal atrial fibrillation on warfarin, carotid stenoses status post left carotid endarterectomy, mixed hyperlipidemia, and possible history of stroke who presents our office for scheduled follow up.  Overall patient is doing reasonably well.  She does continue to have occasional short episodes of skipped beats or heart fluttering but only last for seconds.  She does have some dizziness but no presyncopal or syncopal episodes.  She was transition off of Coumadin over to Eliquis which I think is an excellent option as I do have some concerns regarding her memory and social situation.  I did call her test results to her regarding her Holter monitor and echocardiogram which were normal.  Actually talked with her brother who she is living with and also filled him in as well.        Review of Systems:  Review of Systems   Constitution: Negative.   HENT: Negative.    Eyes: Negative.    Cardiovascular: Positive for irregular heartbeat.   Respiratory: Negative.    Endocrine: Negative.    Hematologic/Lymphatic: Negative.    Skin: Negative.    Musculoskeletal: Negative.    Gastrointestinal: Negative.    Genitourinary: Negative.    Neurological: Negative.    Psychiatric/Behavioral: Positive for memory loss.   Allergic/Immunologic: Negative.        Prior to Admission medications    Medication Sig Start Date End Date Taking? Authorizing Provider   calcium carbonate-vitamin d (Calcium 600+D) 600-400 MG-UNIT per tablet Take 1 tablet by mouth Daily. 10/5/20   Melissa Sandoval APRN   ezetimibe (Zetia) 10 MG tablet Take 1 tablet by mouth Daily. 10/14/20   Melissa Sandoval,  "MAYRA   rosuvastatin (Crestor) 10 MG tablet Take 1 tablet by mouth Daily. 10/6/20   Melissa Sandoval APRN   warfarin (Coumadin) 2.5 MG tablet Take 1 tablet by mouth Every Night. 10/5/20   Melissa Sandoval APRN       Allergies   Allergen Reactions   • Lipitor [Atorvastatin Calcium] Itching       Past Medical History:   Diagnosis Date   • Atrial fibrillation (CMS/HCC)    • Atrial fibrillation (CMS/HCC)    • Osteoporosis        Past Surgical History:   Procedure Laterality Date   • CAROTID ENDARTERECTOMY     • HIP SURGERY     • TONSILLECTOMY         Social History     Socioeconomic History   • Marital status: Single     Spouse name: Not on file   • Number of children: Not on file   • Years of education: Not on file   • Highest education level: Not on file   Tobacco Use   • Smoking status: Never Smoker   • Smokeless tobacco: Never Used   Substance and Sexual Activity   • Alcohol use: Yes     Frequency: Monthly or less     Drinks per session: 1 or 2     Binge frequency: Never     Comment: may 2 times a year/caffeine use    • Drug use: Defer   • Sexual activity: Defer       Family History   Problem Relation Age of Onset   • Heart disease Mother    • Other Mother         low blood sugar   • Heart attack Mother    • Diabetes Father    • Heart attack Father    • Diabetes Brother    • Heart disease Brother    • Heart disease Brother    • Cancer Brother    • Breast cancer Neg Hx    • Colon cancer Neg Hx        The following portions of the patient's history were reviewed and updated as appropriate: allergies, current medications, past family history, past medical history, past social history, past surgical history and problem list.       Objective:       Vitals:    12/03/20 1016   BP: 146/88   BP Location: Left arm   Patient Position: Sitting   Pulse: 69   Weight: 57.3 kg (126 lb 6.4 oz)   Height: 157.5 cm (62.01\")       Physical Exam:  Constitutional: Well appearing, well developed, no acute distress   HENT: Oropharynx clear " and membrane moist  Eyes: Normal conjunctiva, no sclera icterus.  Neck: Supple, no carotid bruit bilaterally.  Cardiovascular: Regular rate and rhythm, No Murmur, No bilateral lower extremity edema.  Pulmonary: Normal respiratory effort, normal lung sounds, no wheezing.  Abdominal: Soft, nontender, no hepatosplenomegaly, liver is non-pulsatile.  Neurological: Alert and orient x 3.   However seems somewhat forgetful regarding her past medical history  Skin: Warm, dry, no ecchymosis, no rash.  Psych: Appropriate mood and affect. Normal judgment and insight.      Lab Results   Component Value Date    BUN 18 10/05/2020    CREATININE 0.73 10/05/2020    EGFRIFNONA 76 10/05/2020    EGFRIFAFRI 92 10/05/2020    BCR 24.7 10/05/2020    K 4.5 10/05/2020    CO2 30.9 (H) 10/05/2020    CALCIUM 9.4 10/05/2020    PROTENTOTREF 6.3 10/05/2020    ALBUMIN 4.70 10/05/2020    LABIL2 2.9 10/05/2020    AST 21 10/05/2020    ALT 10 10/05/2020       Lab Results   Component Value Date    WBC 8.74 10/05/2020    HGB 14.5 10/05/2020    HCT 42.9 10/05/2020    MCV 96.0 10/05/2020     10/05/2020       No results found for: CKTOTAL, CKMB, CKMBINDEX, TROPONINI, TROPONINT    Lab Results   Component Value Date    CHLPL 242 (H) 10/05/2020     Lab Results   Component Value Date    TRIG 68 10/05/2020     Lab Results   Component Value Date    HDL 81 (H) 10/05/2020     Lab Results   Component Value Date     (H) 10/05/2020       No results found for: TSH      ECG 12 Lead    Date/Time: 12/3/2020 11:00 AM  Performed by: Raphael Lainez MD  Authorized by: Raphael Lainez MD   Comparison: compared with previous ECG from 10/15/2020  Similar to previous ECG  Rhythm: sinus rhythm  Q waves: V1 and V2      Clinical impression: normal ECG        48 Hr Holter 11/4/2020:  · A normal monitor study.  · Underlying heart rhythm was sinus rhythm with an average heart rate of 79 bpm and a range of 58 bpm up to 107 bpm    Echocardiogram  11/2/2020:  · Calculated left ventricular EF = 51.6% Estimated left ventricular EF was in agreement with the calculated left ventricular EF. Normal global longitudinal LV strain (GLS) = -23%. Left ventricle strain data was reviewed by the physician and found to be accurate. Normal left ventricular cavity size and wall thickness noted. All left ventricular wall segments contract normally. Left ventricular diastolic function is consistent with (grade I) impaired relaxation.  · Moderate mitral annular calcification is present. There is moderate calcification of the mitral valve posterior leaflet(s). Mild mitral valve regurgitation is present.  · There is moderate calcification of the aortic valve. No significant aortic valve regurgitation is present. No hemodynamically significant aortic valve stenosis is present.        Assessment:          Diagnosis Plan   1. Paroxysmal atrial fibrillation (CMS/HCC)     2. Carotid stenosis, asymptomatic, left     3. Mixed hyperlipidemia            Plan:       Ms. Allen is a 84 y.o. woman with past medical history notable for paroxysmal atrial fibrillation on warfarin, carotid stenoses status post left carotid endarterectomy, mixed hyperlipidemia, and possible history of stroke who presents our office for scheduled follow up.  Overall she seems to be doing fairly well.  Her repeat testing thus far demonstrates no significant abnormalities and I am glad that she was switched over to Eliquis as I had concerns about her ability to not only remember to take Coumadin appropriately but also to get to get her INR checked due to transportation issues.  No changes are needed at this time we will plan on seeing her back in 6 months.      Paroxysmal atrial fibrillation:  · Continue Eliquis  · Minimal symptoms we will hold off on repeat testing    Carotid stenosis status post carotid endarterectomy:  · Continue aspirin  · Continue statin    Mixed hyperlipidemia:  · Continue statin  therapy      Follow-up:  6 Months    Thank you for allowing me to participate in the care of Anatoliy Tinsley. Feel free to contact me directly with any further questions or concerns.    Raphael Lainez MD  [facility]  12/03/20  10:59 EST

## 2020-12-14 ENCOUNTER — HOSPITAL ENCOUNTER (OUTPATIENT)
Dept: BONE DENSITY | Facility: HOSPITAL | Age: 85
Discharge: HOME | End: 2020-12-14
Admitting: NURSE PRACTITIONER

## 2020-12-14 PROCEDURE — 77080 DXA BONE DENSITY AXIAL: CPT

## 2020-12-15 DIAGNOSIS — M81.6 LOCALIZED OSTEOPOROSIS WITHOUT CURRENT PATHOLOGICAL FRACTURE: Primary | ICD-10-CM

## 2020-12-15 RX ORDER — ALENDRONATE SODIUM 70 MG/1
70 TABLET ORAL
Qty: 4 TABLET | Refills: 11 | Status: SHIPPED | OUTPATIENT
Start: 2020-12-15 | End: 2022-01-01 | Stop reason: SDUPTHER

## 2021-01-01 ENCOUNTER — HOSPITAL ENCOUNTER (EMERGENCY)
Facility: HOSPITAL | Age: 86
Discharge: NURSING, REHAB OR LONG-TERM CARE | End: 2021-12-27
Attending: EMERGENCY MEDICINE | Admitting: EMERGENCY MEDICINE

## 2021-01-01 ENCOUNTER — APPOINTMENT (OUTPATIENT)
Dept: CT IMAGING | Facility: HOSPITAL | Age: 86
End: 2021-01-01

## 2021-01-01 ENCOUNTER — APPOINTMENT (OUTPATIENT)
Dept: GENERAL RADIOLOGY | Facility: HOSPITAL | Age: 86
End: 2021-01-01

## 2021-01-01 ENCOUNTER — PATIENT OUTREACH (OUTPATIENT)
Dept: CASE MANAGEMENT | Facility: OTHER | Age: 86
End: 2021-01-01

## 2021-01-01 VITALS
HEART RATE: 73 BPM | WEIGHT: 135 LBS | DIASTOLIC BLOOD PRESSURE: 87 MMHG | SYSTOLIC BLOOD PRESSURE: 131 MMHG | TEMPERATURE: 98.4 F | BODY MASS INDEX: 24.84 KG/M2 | OXYGEN SATURATION: 97 % | HEIGHT: 62 IN | RESPIRATION RATE: 18 BRPM

## 2021-01-01 DIAGNOSIS — Z79.01 CHRONIC ANTICOAGULATION: ICD-10-CM

## 2021-01-01 DIAGNOSIS — M24.562 CONTRACTURE, LEFT KNEE: ICD-10-CM
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
## 2021-01-01 DIAGNOSIS — S39.012A STRAIN OF LUMBAR REGION, INITIAL ENCOUNTER: ICD-10-CM

## 2021-01-01 PROCEDURE — 99283 EMERGENCY DEPT VISIT LOW MDM: CPT

## 2021-01-01 PROCEDURE — 73562 X-RAY EXAM OF KNEE 3: CPT

## 2021-01-01 PROCEDURE — 70450 CT HEAD/BRAIN W/O DYE: CPT

## 2021-01-01 PROCEDURE — 72125 CT NECK SPINE W/O DYE: CPT

## 2021-01-01 RX ORDER — EZETIMIBE 10 MG/1
TABLET ORAL
Qty: 30 TABLET | Refills: 1 | Status: SHIPPED | OUTPATIENT
Start: 2021-01-01 | End: 2022-01-01 | Stop reason: SDUPTHER

## 2021-01-01 RX ORDER — ACETAMINOPHEN 500 MG
1000 TABLET ORAL ONCE
Status: DISCONTINUED | OUTPATIENT
Start: 2021-01-01 | End: 2021-01-01 | Stop reason: HOSPADM

## 2021-02-08 ENCOUNTER — TELEPHONE (OUTPATIENT)
Dept: INTERNAL MEDICINE | Age: 86
End: 2021-02-08

## 2021-02-08 NOTE — TELEPHONE ENCOUNTER
Contact patient.     Recommend that she needs to be seen.   She may want to see her eye doctor first if she has one.   Otherwise, she can go to urgent care.

## 2021-02-08 NOTE — TELEPHONE ENCOUNTER
Caller: Anatoliy Tinsley    Relationship to patient: Self    Best call back number: [phone]    Patient is needing: blood settling in left eye. Patient is on blood thinners and is concerned.  Please call patient and advise

## 2021-03-02 DIAGNOSIS — Z23 IMMUNIZATION DUE: ICD-10-CM

## 2021-03-04 ENCOUNTER — OFFICE VISIT (OUTPATIENT)
Dept: INTERNAL MEDICINE | Age: 86
End: 2021-03-04

## 2021-03-04 VITALS
BODY MASS INDEX: 24.29 KG/M2 | HEART RATE: 87 BPM | TEMPERATURE: 97.1 F | HEIGHT: 62 IN | SYSTOLIC BLOOD PRESSURE: 130 MMHG | WEIGHT: 132 LBS | OXYGEN SATURATION: 95 % | DIASTOLIC BLOOD PRESSURE: 80 MMHG

## 2021-03-04 DIAGNOSIS — R47.01 APHASIA: ICD-10-CM

## 2021-03-04 DIAGNOSIS — Z79.01 ANTICOAGULATED: ICD-10-CM

## 2021-03-04 DIAGNOSIS — M81.0 OSTEOPOROSIS, UNSPECIFIED OSTEOPOROSIS TYPE, UNSPECIFIED PATHOLOGICAL FRACTURE PRESENCE: ICD-10-CM

## 2021-03-04 DIAGNOSIS — M79.10 MYALGIA: ICD-10-CM

## 2021-03-04 DIAGNOSIS — R49.0 HOARSENESS: ICD-10-CM

## 2021-03-04 DIAGNOSIS — I48.0 PAROXYSMAL ATRIAL FIBRILLATION (HCC): ICD-10-CM

## 2021-03-04 DIAGNOSIS — Z86.73 HISTORY OF TRANSIENT ISCHEMIC ATTACK (TIA): ICD-10-CM

## 2021-03-04 DIAGNOSIS — E78.2 MIXED HYPERLIPIDEMIA: Primary | ICD-10-CM

## 2021-03-04 PROCEDURE — 99214 OFFICE O/P EST MOD 30 MIN: CPT | Performed by: NURSE PRACTITIONER

## 2021-03-04 RX ORDER — CETIRIZINE HYDROCHLORIDE 10 MG/1
5 TABLET ORAL DAILY
COMMUNITY
Start: 2021-02-08 | End: 2021-03-04

## 2021-03-04 NOTE — PROGRESS NOTES
"Chief Complaint  Leg Pain, Transient Ischemic Attack, Hoarse, and Atrial Fibrillation    Subjective          Anatoliy Tinsley presents to [facility]  History of Present Illness     History of atrial fibrillation, placed on Eliquis a few months ago due to difficulties with compliance     Reports recent episode \"my silent thinking works fine, she is unable to express her words on her own\". This is associated with lightheadedness. She is unable to differentiate how long this lasts, usually a few minutes.    She thinks she may have been diagnosed with \"ischemia\", TIA? Years ago prior to moving to [place]. Last CT scan 2018 and can be accessed in Epic.   She denies any slurring, facial droop, weakness.     Joint/Muscle Pain: Patient complains of myalgias for which has been present for several years. Pain is located in bilateral lower legs, is described as aching, and is intermittent . No claudication. No significant muscle cramps.  Associated symptoms include: none.  The patient has tried nothing for pain relief.  Related to injury:   no.    She is also wanting to discuss issues with \"her throat\". Reports \"gets hoarse\" after talking for extended periods of time. Denies any dysphagia or swallowing difficulties.  No pain.        Objective   Vital Signs:   /80   Pulse 87   Temp 97.1 °F (36.2 °C) (Temporal)   Ht 157.5 cm (62.01\")   Wt 59.9 kg (132 lb)   SpO2 95%   BMI 24.14 kg/m²     Physical Exam  Vitals signs and nursing note reviewed.   Constitutional:       General: She is not in acute distress.     Appearance: She is well-developed. She is not ill-appearing.   HENT:      Right Ear: Decreased hearing noted.      Left Ear: Decreased hearing noted.   Cardiovascular:      Rate and Rhythm: Normal rate and regular rhythm.      Heart sounds: Normal heart sounds, S1 normal and S2 normal. No murmur.   Pulmonary:      Effort: Pulmonary effort is normal.      Breath sounds: Normal breath " sounds. No decreased breath sounds, wheezing, rhonchi or rales.   Musculoskeletal:      Right lower leg: Normal. No edema.      Left lower leg: Normal. No edema.   Skin:     General: Skin is warm and dry.   Neurological:      Mental Status: She is alert and oriented to person, place, and time.   Psychiatric:         Speech: Speech normal.         Behavior: Behavior normal. Behavior is cooperative.         Thought Content: Thought content normal.         Judgment: Judgment normal.        Result Review :{Labs  Result Review  Imaging  Med Tab  Media :23}   The following data was reviewed by: MAYRA Griffith on 03/04/2021:  Common labs    Common Labsle 10/5/20 10/5/20 10/5/20 10/5/20    1359 1359 1359 1359   Glucose  76     BUN  18     Creatinine  0.73     eGFR Non  Am  76     eGFR African Am  92     Sodium  145     Potassium  4.5     Chloride  106     Calcium  9.4     Total Protein  6.3     Albumin  4.70     Total Bilirubin  0.3     Alkaline Phosphatase  83     AST (SGOT)  21     ALT (SGPT)  10     WBC 8.74      Hemoglobin 14.5      Hematocrit 42.9      Platelets 270      Total Cholesterol    242 (A)   Triglycerides    68   HDL Cholesterol    81 (A)   LDL Cholesterol     147 (A)   Hemoglobin A1C   5.30    (A) Abnormal value       Comments are available for some flowsheets but are not being displayed.                  Assessment and Plan    Diagnoses and all orders for this visit:    1. Mixed hyperlipidemia (Primary)  Check fasting lipid panel today.  Adjustment of medication as necessary.  Continue to follow and improve on low-fat, low carbohydrate diet.     -     Lipid Panel With / Chol / HDL Ratio    2. Paroxysmal atrial fibrillation (CMS/HCC)  -     CBC & Differential  -     Comprehensive Metabolic Panel    3. Osteoporosis, unspecified osteoporosis type, unspecified pathological fracture presence    4. Anticoagulated  -     CBC & Differential    5. Aphasia  Patient complains of recent episode of  what sounds like aphasia/TIA.  She has had multiple episodes like this in the past, although none recently.  Her neuro exam today is essentially normal, no concerns for active symptoms.  Recommend obtain updated CT of the head, referral to neurology.     -     CT Head Without Contrast  -     Ambulatory Referral to Neurology    6. History of transient ischemic attack (TIA)  -     Lipid Panel With / Chol / HDL Ratio  -     CT Head Without Contrast  -     Ambulatory Referral to Neurology    7. Myalgia  -     CBC & Differential  -     Comprehensive Metabolic Panel  -     TSH+Free T4  -     Magnesium    8. Hoarseness  -     Ambulatory Referral to ENT (Otolaryngology)        Follow Up   Return in about 6 months (around 9/4/2021) for Next scheduled follow up.  Patient was given instructions and counseling regarding her condition or for health maintenance advice. Please see specific information pulled into the AVS if appropriate.

## 2021-03-05 LAB
ALBUMIN SERPL-MCNC: 4.4 G/DL (ref 3.5–5.2)
ALBUMIN/GLOB SERPL: 2.1 G/DL
ALP SERPL-CCNC: 78 U/L (ref 39–117)
ALT SERPL-CCNC: 7 U/L (ref 1–33)
AST SERPL-CCNC: 21 U/L (ref 1–32)
BASOPHILS # BLD AUTO: 0.05 10*3/MM3 (ref 0–0.2)
BASOPHILS NFR BLD AUTO: 0.8 % (ref 0–1.5)
BILIRUB SERPL-MCNC: 0.4 MG/DL (ref 0–1.2)
BUN SERPL-MCNC: 18 MG/DL (ref 8–23)
BUN/CREAT SERPL: 20.5 (ref 7–25)
CALCIUM SERPL-MCNC: 10.3 MG/DL (ref 8.6–10.5)
CHLORIDE SERPL-SCNC: 103 MMOL/L (ref 98–107)
CHOLEST SERPL-MCNC: 216 MG/DL (ref 0–200)
CHOLEST/HDLC SERPL: 2.88 {RATIO}
CO2 SERPL-SCNC: 31.1 MMOL/L (ref 22–29)
CREAT SERPL-MCNC: 0.88 MG/DL (ref 0.57–1)
EOSINOPHIL # BLD AUTO: 0.07 10*3/MM3 (ref 0–0.4)
EOSINOPHIL NFR BLD AUTO: 1.1 % (ref 0.3–6.2)
ERYTHROCYTE [DISTWIDTH] IN BLOOD BY AUTOMATED COUNT: 11.9 % (ref 12.3–15.4)
GLOBULIN SER CALC-MCNC: 2.1 GM/DL
GLUCOSE SERPL-MCNC: 74 MG/DL (ref 65–99)
HCT VFR BLD AUTO: 45.3 % (ref 34–46.6)
HDLC SERPL-MCNC: 75 MG/DL (ref 40–60)
HGB BLD-MCNC: 14.7 G/DL (ref 12–15.9)
IMM GRANULOCYTES # BLD AUTO: 0.02 10*3/MM3 (ref 0–0.05)
IMM GRANULOCYTES NFR BLD AUTO: 0.3 % (ref 0–0.5)
LDLC SERPL CALC-MCNC: 128 MG/DL (ref 0–100)
LYMPHOCYTES # BLD AUTO: 1.45 10*3/MM3 (ref 0.7–3.1)
LYMPHOCYTES NFR BLD AUTO: 23 % (ref 19.6–45.3)
MAGNESIUM SERPL-MCNC: 2.1 MG/DL (ref 1.6–2.4)
MCH RBC QN AUTO: 30.9 PG (ref 26.6–33)
MCHC RBC AUTO-ENTMCNC: 32.5 G/DL (ref 31.5–35.7)
MCV RBC AUTO: 95.4 FL (ref 79–97)
MONOCYTES # BLD AUTO: 0.52 10*3/MM3 (ref 0.1–0.9)
MONOCYTES NFR BLD AUTO: 8.2 % (ref 5–12)
NEUTROPHILS # BLD AUTO: 4.2 10*3/MM3 (ref 1.7–7)
NEUTROPHILS NFR BLD AUTO: 66.6 % (ref 42.7–76)
NRBC BLD AUTO-RTO: 0 /100 WBC (ref 0–0.2)
PLATELET # BLD AUTO: 269 10*3/MM3 (ref 140–450)
POTASSIUM SERPL-SCNC: 4.4 MMOL/L (ref 3.5–5.2)
PROT SERPL-MCNC: 6.5 G/DL (ref 6–8.5)
RBC # BLD AUTO: 4.75 10*6/MM3 (ref 3.77–5.28)
SODIUM SERPL-SCNC: 147 MMOL/L (ref 136–145)
T4 FREE SERPL-MCNC: 1.18 NG/DL (ref 0.93–1.7)
TRIGL SERPL-MCNC: 75 MG/DL (ref 0–150)
TSH SERPL DL<=0.005 MIU/L-ACNC: 1.09 UIU/ML (ref 0.27–4.2)
VLDLC SERPL CALC-MCNC: 13 MG/DL (ref 5–40)
WBC # BLD AUTO: 6.31 10*3/MM3 (ref 3.4–10.8)

## 2021-03-09 RX ORDER — EZETIMIBE 10 MG/1
TABLET ORAL
Qty: 90 TABLET | Refills: 1 | Status: SHIPPED | OUTPATIENT
Start: 2021-03-09 | End: 2021-01-01

## 2021-03-10 ENCOUNTER — TELEPHONE (OUTPATIENT)
Dept: INTERNAL MEDICINE | Age: 86
End: 2021-03-10

## 2021-03-10 NOTE — TELEPHONE ENCOUNTER
Contact patient.     Why does she think the Eliquis is making her lightheaded? It is highly unlikely that this medication would cause this. If she is having dizziness and/or lightheadedness, she needs to be evaluated (either myself, cardiologist, or ER).

## 2021-03-10 NOTE — TELEPHONE ENCOUNTER
Caller: Anatoliy Tinsley    Relationship to patient: Self    Best call back number: [phone]    Concerns or Questions if Applicable: Patient is calling to state the Eliquis is making her light headed.  She is wanting to know if there would be a different comparable medication that she can take instead.      REBECCA [address] - [address] AT [facility] - [phone] [facility] [phone]   [phone]    Please advise.

## 2021-03-26 ENCOUNTER — APPOINTMENT (OUTPATIENT)
Dept: CT IMAGING | Facility: HOSPITAL | Age: 86
End: 2021-03-26

## 2021-03-29 DIAGNOSIS — I48.0 PAROXYSMAL ATRIAL FIBRILLATION (HCC): ICD-10-CM

## 2021-03-29 RX ORDER — APIXABAN 2.5 MG/1
TABLET, FILM COATED ORAL
Qty: 180 TABLET | Refills: 4 | Status: SHIPPED | OUTPATIENT
Start: 2021-03-29 | End: 2022-01-01 | Stop reason: SDUPTHER

## 2021-04-12 ENCOUNTER — TELEPHONE (OUTPATIENT)
Dept: INTERNAL MEDICINE | Age: 86
End: 2021-04-12

## 2021-04-12 NOTE — TELEPHONE ENCOUNTER
Pt cancelled ct (over due since march)  lvm to ask pt if she plan to do ct or not . To call us back .

## 2021-12-27 NOTE — ED PROVIDER NOTES
EMERGENCY DEPARTMENT ENCOUNTER    Room Number:  35/35  Date of encounter:  12/27/2021  PCP: Melissa Sandoval APRN  Historian: Patient    Patient was placed in face mask during triage process. Patient was wearing facemask when I entered the room and throughout our encounter. I wore full protective equipment throughout this patient encounter including a face mask, eye protection, and gloves. Hand hygiene was performed before donning protective equipment and again following doffing of PPE after leaving the room.    HPI:  Chief Complaint: Rolled out of bed, low back and left knee pain  A complete HPI/ROS/PMH/PSH/SH/FH are unobtainable due to: N/A   Context: Anatoliy Tinsley is a 86 y.o. female who is chronically anticoagulated for history of paroxysmal atrial fibrillation presents to the ED c/o low back and left knee discomfort status post rolling out of bed. Patient cannot tell me for sure whether she hit her head or not but has no sore spots at this time. She reports that she was in bed rolled over and excellently slipped into the floor. She said she landed primarily on her buttocks which is sore and hurts worse when she moves from sitting to standing but then resolved. She has been able to walk without difficulty and denies any focal numbness or weakness. She denies any headache, vision change or hearing change. The patient does note that she is chronically hearing impaired. She denies any chest pain, shortness of breath, nausea or vomiting. She states she was in bed resting when this occurred and denies syncope.  No exacerbating relieving factors identified.      MEDICAL HISTORY REVIEW      PAST MEDICAL HISTORY  Active Ambulatory Problems     Diagnosis Date Noted   • Paroxysmal atrial fibrillation (HCC) 10/15/2020   • Carotid stenosis, asymptomatic, left 10/15/2020   • Mixed hyperlipidemia 10/15/2020   • Osteoporosis      Resolved Ambulatory Problems     Diagnosis Date Noted   • No Resolved Ambulatory Problems      Past Medical History:   Diagnosis Date   • Atrial fibrillation (HCC)    • Atrial fibrillation (HCC)          PAST SURGICAL HISTORY  Past Surgical History:   Procedure Laterality Date   • CAROTID ENDARTERECTOMY     • HIP SURGERY     • TONSILLECTOMY           FAMILY HISTORY  Family History   Problem Relation Age of Onset   • Heart disease Mother    • Other Mother         low blood sugar   • Heart attack Mother    • Diabetes Father    • Heart attack Father    • Diabetes Brother    • Heart disease Brother    • Heart disease Brother    • Cancer Brother    • Breast cancer Neg Hx    • Colon cancer Neg Hx          SOCIAL HISTORY  Social History     Socioeconomic History   • Marital status: Single   Tobacco Use   • Smoking status: Never Smoker   • Smokeless tobacco: Never Used   Substance and Sexual Activity   • Alcohol use: Yes     Comment: may 2 times a year/caffeine use    • Drug use: Defer   • Sexual activity: Defer         ALLERGIES  Lipitor [atorvastatin calcium]        REVIEW OF SYSTEMS  Review of Systems     All systems reviewed and negative except for those discussed in HPI.       PHYSICAL EXAM    I have reviewed the triage vital signs and nursing notes.    ED Triage Vitals [12/27/21 0303]   Temp Heart Rate Resp BP SpO2   98.4 °F (36.9 °C) 73 18 131/87 97 %      Temp src Heart Rate Source Patient Position BP Location FiO2 (%)   -- -- -- -- --       Physical Exam    Physical Exam   Constitutional: No distress. Pleasant but hard of hearing. Walking around the hallways of the emergency department without difficulty.  HENT:  Head: Normocephalic and atraumatic. No traumatic findings of the calvarium identified. No focal tenderness to palpation at all.  Oropharynx: Mucous membranes are moist.   Eyes: No scleral icterus. No conjunctival pallor.  Neck: Painless range of motion noted. Neck supple. No midline tenderness to palpation or step-off.  Cardiovascular: Equal and well perfused with strong radial pulse  noted.  Pulmonary/Chest: No respiratory distress. No tachypnea or increased work of breathing appreciated.    Musculoskeletal: Moves all extremities equally. There is no pedal edema or calf tenderness. Small ecchymosis over left patella with full range of motion intact without significant tenderness. Patient able to extend and flex at the knee without difficulty. There is no midline spinal tenderness of the thoracic or lumbar. There are some mild soft tissue tenderness overlying the buttock bilaterally. Patient is able to stand on 1 foot and then the other without difficulty.  Neurological: Alert.  Baseline strength and sensation noted.   Skin: Skin is pink, warm, and dry. No pallor.   Psychiatric: Mood and affect normal.   Nursing note and vitals reviewed.    LAB RESULTS  No results found for this or any previous visit (from the past 24 hour(s)).    Ordered the above labs and independently reviewed the results.        RADIOLOGY  XR Knee 3 View Left    Result Date: 12/27/2021  3 VIEWS LEFT KNEE  HISTORY: Knee pain and swelling after a fall  COMPARISON: None available.  FINDINGS: There is a lucency involving the patella on the sunrise view only, which may reflect acute fracture. However, there is no suprapatellar effusion. No additional fractures are seen. Dense vascular calcifications are present.      There is a lucency involving the patella, seen on the single view only. However, acute fracture cannot be excluded, given history of trauma.  This report was finalized on 12/27/2021 5:15 AM by Dr. Toshia Linder M.D.      CT Head Without Contrast    Result Date: 12/27/2021  CT HEAD WITHOUT CONTRAST  HISTORY: Fall and hit head  COMPARISON: None available.  TECHNIQUE: Axial CT imaging was obtained through the brain. No IV contrast was administered.  FINDINGS: Images are degraded by streak artifact from the patient's hearing aids. No obvious acute intracranial hemorrhage is seen. There is diffuse atrophy. There is  periventricular and deep white matter microangiopathic change. There is no midline shift or mass effect. No calvarial fracture is seen. There is extensive opacification of the ethmoid sinuses. Mucosal thickening is noted within the maxillary and sphenoid sinuses, as well as air-fluid levels. Correlation with any history of sinusitis is recommended.       1. No acute intracranial findings. 2. Sinus inflammatory changes. Correlation with any evidence of sinusitis is recommended.  Radiation dose reduction techniques were utilized, including automated exposure control and exposure modulation based on body size.  This report was finalized on 12/27/2021 4:59 AM by Dr. Toshia Linder M.D.      CT Cervical Spine Without Contrast    Result Date: 12/27/2021  CT CERVICAL SPINE  HISTORY: Fall  COMPARISON: None available.  TECHNIQUE: Axial CT imaging was obtained through the cervical spine. Coronal and sagittal reformatted images were obtained.  FINDINGS: No acute fracture or subluxation of cervical spine is seen. Intervertebral disc space narrowing is noted at multiple levels, but is most significant at C4-C5 and C6-C7. There is no prevertebral soft tissue swelling.  C2-C3: There is mild canal stenosis. There is neural foraminal narrowing on the right. C3-C4: There is moderate canal stenosis. There is near bilateral neural foraminal narrowing. C4-C5: There is severe canal stenosis. There is bilateral neural foraminal narrowing. C5-C6: There is severe canal stenosis. There is bilateral neural foraminal narrowing, left greater than right. C6-C7: There is severe canal stenosis. There is bilateral neural foraminal narrowing, left greater than right. C7-T1: There is no canal stenosis or neural foraminal narrowing.      No acute fracture or subluxation identified.  Radiation dose reduction techniques were utilized, including automated exposure control and exposure modulation based on body size.  This report was finalized on  12/27/2021 5:04 AM by Dr. Toshia Linder M.D.        I ordered the above noted radiological studies. Reviewed by me and discussed with radiologist.  See dictation for official radiology interpretation.      PROCEDURES    Procedures        MEDICATIONS GIVEN IN ER    Medications   acetaminophen (TYLENOL) tablet 1,000 mg (has no administration in time range)         PROGRESS, DATA ANALYSIS, CONSULTS, AND MEDICAL DECISION MAKING    My diagnosis for lower extremity pain and injury includes but is not limited to hip fracture, femur fracture, hip dislocation, hip contusion, hip sprain, hip strain, pelvic fracture, knee sprain, patella dislocation, knee dislocation, internal derangement of knee, fractures of the femur, tibia, fibula, ankle, foot and digits, ankle sprain, ankle dislocation, Lisfranc fracture, fracture dislocations of the digits, pulmonary embolism, claudication, peripheral vascular disease, gout, osteoarthritis, rheumatoid arthritis, bursitis, septic joint, poly-rheumatica, polyarthralgia and other inflammatory or infectious disease processes.      All labs have been independently reviewed by me.  All radiology studies have been reviewed by me and discussed with radiologist dictating the report.   EKG's independently viewed and interpreted by me.  Discussion below represents my analysis of pertinent findings related to patient's condition, differential diagnosis, treatment plan and final disposition.      ED Course as of 12/27/21 0838   Mon Dec 27, 2021   0627 CT Cervical Spine Without Contrast [TJ]      ED Course User Index  [TJ] Luis Samuel MD       AS OF 08:38 EST VITALS:    BP - 131/87  HR - 73  TEMP - 98.4 °F (36.9 °C)  O2 SATS - 97%        DIAGNOSIS  Final diagnoses:   Fall in home, initial encounter   Contracture, left knee   Strain of lumbar region, initial encounter   Chronic anticoagulation         DISPOSITION  DISCHARGE    Patient discharged in stable condition.    Reviewed  implications of results, diagnosis, meds, responsibility to follow up, warning signs and symptoms of possible worsening, potential complications and reasons to return to ER.    Patient/Family voiced understanding of above instructions.    Discussed plan for discharge, as there is no emergent indication for admission. Patient referred to primary care provider for regular health maintenance. Pt/family is agreeable and understands need for follow up and possible repeat testing.  Pt is aware that discharge does not mean that nothing is wrong but it indicates no emergency is present that requires admission and they must continue care with follow-up as given below or physician of their choice.     FOLLOW-UP  Melissa Sandoval, APRN  [address] [id number] [phone]    Schedule an appointment as soon as possible for a visit   As needed    [facility] Emergency Department  [address] [place] [address] [phone]  Go to   As needed, If symptoms worsen, If symptoms fail to improve         Medication List      No changes were made to your prescriptions during this visit.            Anatoly Mclaughlin MD  12/27/21 9860

## 2021-12-27 NOTE — ED TRIAGE NOTES
Pt arrived to ER via EMS from [facility] after a fall from bed. Pt was in bed and fell out of bed and c/o of L sided pain. Pt/staff denies LOC. Pt stated she thinks she took blood thinners in the past, med rec shows she's on Eliquis.   Pt in NAD in triage and in mask.  This RN in appropriate PPE during care.

## 2021-12-28 NOTE — OUTREACH NOTE
Ambulatory Case Management Note  Patient Outreach  Talked with patient's brotherEdgar (POA). Discussed 12/27/21 ED visit regarding contracture of left knee with fall. [facility]  Brother states to be compliant with ED recommendations and states symptoms have improved. Brother states will be making PCP appointment for ED follow up and recommendations.  [facility] in [facility] ([facility]); independent with ADL's; meal provided by facility and receiving assistance with transportation from Edgar delgado.  Patient ambulates without assistive device and has access to Life Alert. . Brother organizes medications for patient but he states she may decide not take medications. He states patient has no difficulty with chest pain; SOB; appetite or sleeping. POA states to appreciate phone call.  No further questions or concerns voiced at this time.   General & Health Literacy Assessment    Questions/Answers      Most Recent Value   Assessment Completed With Family  [Brother ]   Living Arrangement Alone  [Patient lives in [facility] ([facility]),  independent with ADL's,  meal provided by facility and receiving assistance with transportation from Edgar delgado.  Patient ambulates without assistive device and has access to Life Alert. .]   Type of Residence --  [Independent Living Facility]   Home Care Services No   Equiptment Used at Home None   Bed or Wheelchair Confined No   Difficulty Keeping Appointments No        Care Evaluation    Questions/Answers      Most Recent Value   AWV Materials Send Materials   Care Gaps Addressed Other (See Comment),  Flu Shot,  Pneumonia Vaccine  [COVID 19 vaccine and booster completed]   Flu Shot Status Patient will Complete   Pneumonia Vaccine Status Patient will Complete   Other Patient Education/Resources  24/[facility] Nurse Call Line,  Advanced Care Planning,  Letitia  [Reviewed with brother ED AVS, education,  home health services,   COVID 19  precautions,  24/7 Nurse Line Telephone number,  ACM contact information,  Advance Directives,  My Chart,  gaps in care,  AWV and Case Management services.  POA verbalized understanding]   24/7 Nurse Call Line Education Method Verbal   ACP Education Method Verbal   MyChart Education Method Verbal   Medication Adherence --  [Has assistance from brother regarding organization of medications but sometimes does not take medications]      SDOH updated and reviewed with the patient during this program:     Financial Resource Strain: Low Risk    • Difficulty of Paying Living Expenses: Not hard at all       Food Insecurity: No Food Insecurity   • Worried About Running Out of Food in the Last Year: Never true   • Ran Out of Food in the Last Year: Never true       Transportation Needs: No Transportation Needs   • Lack of Transportation (Medical): No   • Lack of Transportation (Non-Medical): No       Jess Espinoza RN  Ambulatory Case Management    12/28/2021, 13:31 EST

## 2022-01-01 ENCOUNTER — OFFICE VISIT (OUTPATIENT)
Dept: CARDIOLOGY | Facility: CLINIC | Age: 87
End: 2022-01-01

## 2022-01-01 ENCOUNTER — APPOINTMENT (OUTPATIENT)
Dept: GENERAL RADIOLOGY | Facility: HOSPITAL | Age: 87
End: 2022-01-01

## 2022-01-01 ENCOUNTER — APPOINTMENT (OUTPATIENT)
Dept: CT IMAGING | Facility: HOSPITAL | Age: 87
End: 2022-01-01

## 2022-01-01 ENCOUNTER — HOSPITAL ENCOUNTER (EMERGENCY)
Facility: HOSPITAL | Age: 87
Discharge: HOME | End: 2022-03-11
Attending: EMERGENCY MEDICINE | Admitting: EMERGENCY MEDICINE

## 2022-01-01 ENCOUNTER — OFFICE VISIT (OUTPATIENT)
Dept: INTERNAL MEDICINE | Age: 87
End: 2022-01-01

## 2022-01-01 ENCOUNTER — HOSPITAL ENCOUNTER (EMERGENCY)
Facility: HOSPITAL | Age: 87
Discharge: NURSING, REHAB OR LONG-TERM CARE | End: 2022-10-27
Attending: EMERGENCY MEDICINE | Admitting: EMERGENCY MEDICINE

## 2022-01-01 ENCOUNTER — HOSPITAL ENCOUNTER (EMERGENCY)
Facility: HOSPITAL | Age: 87
Discharge: NURSING, REHAB OR LONG-TERM CARE | End: 2022-09-16
Attending: EMERGENCY MEDICINE | Admitting: EMERGENCY MEDICINE

## 2022-01-01 ENCOUNTER — APPOINTMENT (OUTPATIENT)
Dept: CARDIOLOGY | Facility: HOSPITAL | Age: 87
End: 2022-01-01

## 2022-01-01 ENCOUNTER — HOSPITAL ENCOUNTER (EMERGENCY)
Facility: HOSPITAL | Age: 87
Discharge: NURSING, REHAB OR LONG-TERM CARE | End: 2022-11-12
Attending: EMERGENCY MEDICINE | Admitting: EMERGENCY MEDICINE

## 2022-01-01 ENCOUNTER — HOSPITAL ENCOUNTER (INPATIENT)
Facility: HOSPITAL | Age: 87
LOS: 9 days | End: 2022-12-08
Attending: EMERGENCY MEDICINE | Admitting: HOSPITALIST

## 2022-01-01 ENCOUNTER — HOSPITAL ENCOUNTER (EMERGENCY)
Facility: HOSPITAL | Age: 87
Discharge: HOME | End: 2022-11-06
Attending: EMERGENCY MEDICINE | Admitting: EMERGENCY MEDICINE

## 2022-01-01 ENCOUNTER — PATIENT ROUNDING (BHMG ONLY) (OUTPATIENT)
Dept: INTERNAL MEDICINE | Age: 87
End: 2022-01-01

## 2022-01-01 ENCOUNTER — PATIENT OUTREACH (OUTPATIENT)
Dept: CASE MANAGEMENT | Facility: OTHER | Age: 87
End: 2022-01-01

## 2022-01-01 ENCOUNTER — TELEPHONE (OUTPATIENT)
Dept: INTERNAL MEDICINE | Age: 87
End: 2022-01-01

## 2022-01-01 VITALS
OXYGEN SATURATION: 97 % | BODY MASS INDEX: 23.22 KG/M2 | WEIGHT: 126.2 LBS | SYSTOLIC BLOOD PRESSURE: 110 MMHG | TEMPERATURE: 98 F | HEART RATE: 99 BPM | HEIGHT: 62 IN | DIASTOLIC BLOOD PRESSURE: 70 MMHG

## 2022-01-01 VITALS
HEIGHT: 64 IN | DIASTOLIC BLOOD PRESSURE: 90 MMHG | TEMPERATURE: 97.3 F | SYSTOLIC BLOOD PRESSURE: 142 MMHG | BODY MASS INDEX: 23.12 KG/M2 | WEIGHT: 135.4 LBS | OXYGEN SATURATION: 97 % | HEART RATE: 88 BPM

## 2022-01-01 VITALS
HEART RATE: 88 BPM | TEMPERATURE: 97.5 F | DIASTOLIC BLOOD PRESSURE: 80 MMHG | SYSTOLIC BLOOD PRESSURE: 138 MMHG | HEIGHT: 64 IN | WEIGHT: 127.2 LBS | BODY MASS INDEX: 21.72 KG/M2 | OXYGEN SATURATION: 99 %

## 2022-01-01 VITALS
WEIGHT: 139 LBS | SYSTOLIC BLOOD PRESSURE: 111 MMHG | BODY MASS INDEX: 24.63 KG/M2 | DIASTOLIC BLOOD PRESSURE: 70 MMHG | TEMPERATURE: 98.4 F | HEIGHT: 63 IN

## 2022-01-01 VITALS
HEART RATE: 81 BPM | OXYGEN SATURATION: 98 % | HEIGHT: 64 IN | DIASTOLIC BLOOD PRESSURE: 104 MMHG | SYSTOLIC BLOOD PRESSURE: 174 MMHG | TEMPERATURE: 97.6 F | WEIGHT: 150 LBS | RESPIRATION RATE: 18 BRPM | BODY MASS INDEX: 25.61 KG/M2

## 2022-01-01 VITALS
TEMPERATURE: 96.8 F | SYSTOLIC BLOOD PRESSURE: 163 MMHG | HEART RATE: 70 BPM | RESPIRATION RATE: 18 BRPM | OXYGEN SATURATION: 98 % | DIASTOLIC BLOOD PRESSURE: 73 MMHG

## 2022-01-01 VITALS
HEIGHT: 63 IN | OXYGEN SATURATION: 94 % | HEART RATE: 95 BPM | WEIGHT: 130 LBS | SYSTOLIC BLOOD PRESSURE: 113 MMHG | DIASTOLIC BLOOD PRESSURE: 78 MMHG | TEMPERATURE: 97.8 F | RESPIRATION RATE: 18 BRPM | BODY MASS INDEX: 23.04 KG/M2

## 2022-01-01 VITALS
TEMPERATURE: 98.6 F | SYSTOLIC BLOOD PRESSURE: 165 MMHG | OXYGEN SATURATION: 97 % | WEIGHT: 131 LBS | HEART RATE: 85 BPM | RESPIRATION RATE: 16 BRPM | DIASTOLIC BLOOD PRESSURE: 98 MMHG | BODY MASS INDEX: 22.36 KG/M2 | HEIGHT: 64 IN

## 2022-01-01 VITALS
OXYGEN SATURATION: 95 % | RESPIRATION RATE: 16 BRPM | SYSTOLIC BLOOD PRESSURE: 104 MMHG | TEMPERATURE: 99.1 F | DIASTOLIC BLOOD PRESSURE: 75 MMHG | HEART RATE: 86 BPM

## 2022-01-01 VITALS
SYSTOLIC BLOOD PRESSURE: 122 MMHG | WEIGHT: 128.2 LBS | HEIGHT: 64 IN | DIASTOLIC BLOOD PRESSURE: 70 MMHG | HEART RATE: 92 BPM | BODY MASS INDEX: 21.89 KG/M2

## 2022-01-01 DIAGNOSIS — R49.0 HOARSE: ICD-10-CM

## 2022-01-01 DIAGNOSIS — L03.116 CELLULITIS OF LEFT LOWER EXTREMITY: Primary | ICD-10-CM

## 2022-01-01 DIAGNOSIS — S02.2XXA CLOSED FRACTURE OF NASAL BONE, INITIAL ENCOUNTER: ICD-10-CM

## 2022-01-01 DIAGNOSIS — I48.0 PAROXYSMAL ATRIAL FIBRILLATION: Primary | ICD-10-CM

## 2022-01-01 DIAGNOSIS — M81.6 LOCALIZED OSTEOPOROSIS WITHOUT CURRENT PATHOLOGICAL FRACTURE: ICD-10-CM

## 2022-01-01 DIAGNOSIS — E78.5 HYPERLIPIDEMIA, UNSPECIFIED HYPERLIPIDEMIA TYPE: ICD-10-CM

## 2022-01-01 DIAGNOSIS — S16.1XXA STRAIN OF NECK MUSCLE, INITIAL ENCOUNTER: ICD-10-CM

## 2022-01-01 DIAGNOSIS — M81.0 OSTEOPOROSIS, UNSPECIFIED OSTEOPOROSIS TYPE, UNSPECIFIED PATHOLOGICAL FRACTURE PRESENCE: ICD-10-CM

## 2022-01-01 DIAGNOSIS — M79.602 LEFT ARM PAIN: ICD-10-CM

## 2022-01-01 DIAGNOSIS — D64.9 ANEMIA, UNSPECIFIED TYPE: ICD-10-CM

## 2022-01-01 DIAGNOSIS — Z79.01 CHRONIC ANTICOAGULATION: ICD-10-CM

## 2022-01-01 DIAGNOSIS — S52.021A CLOSED OLECRANON FRACTURE, RIGHT, INITIAL ENCOUNTER: ICD-10-CM

## 2022-01-01 DIAGNOSIS — R29.6 UNWITNESSED FALL: ICD-10-CM

## 2022-01-01 DIAGNOSIS — S09.90XA CLOSED HEAD INJURY, INITIAL ENCOUNTER: Primary | ICD-10-CM

## 2022-01-01 DIAGNOSIS — R10.32 LEFT LOWER QUADRANT ABDOMINAL PAIN: Primary | ICD-10-CM

## 2022-01-01 DIAGNOSIS — S01.112A EYEBROW LACERATION, LEFT, INITIAL ENCOUNTER: ICD-10-CM

## 2022-01-01 DIAGNOSIS — R47.01 APHASIA: ICD-10-CM

## 2022-01-01 DIAGNOSIS — I65.22 CAROTID STENOSIS, ASYMPTOMATIC, LEFT: ICD-10-CM

## 2022-01-01 DIAGNOSIS — Z00.00 MEDICARE ANNUAL WELLNESS VISIT, SUBSEQUENT: Primary | ICD-10-CM

## 2022-01-01 DIAGNOSIS — I48.0 PAROXYSMAL ATRIAL FIBRILLATION: ICD-10-CM
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
## 2022-01-01 DIAGNOSIS — F03.90 DEMENTIA WITHOUT BEHAVIORAL DISTURBANCE, PSYCHOTIC DISTURBANCE, MOOD DISTURBANCE, OR ANXIETY, UNSPECIFIED DEMENTIA SEVERITY, UNSPECIFIED DEMENTIA TYPE: ICD-10-CM

## 2022-01-01 DIAGNOSIS — S00.03XA TRAUMATIC HEMATOMA OF SCALP, INITIAL ENCOUNTER: Primary | ICD-10-CM

## 2022-01-01 DIAGNOSIS — E78.2 MIXED HYPERLIPIDEMIA: Primary | ICD-10-CM

## 2022-01-01 DIAGNOSIS — S09.90XA MINOR HEAD INJURY, INITIAL ENCOUNTER: Primary | ICD-10-CM

## 2022-01-01 DIAGNOSIS — S01.01XA LACERATION OF SCALP, INITIAL ENCOUNTER: ICD-10-CM

## 2022-01-01 DIAGNOSIS — S60.211A CONTUSION OF MULTIPLE SITES OF RIGHT HAND AND WRIST, INITIAL ENCOUNTER: ICD-10-CM
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
## 2022-01-01 DIAGNOSIS — M25.551 ACUTE RIGHT HIP PAIN: ICD-10-CM

## 2022-01-01 DIAGNOSIS — S09.90XA CLOSED HEAD INJURY, INITIAL ENCOUNTER: ICD-10-CM

## 2022-01-01 DIAGNOSIS — S30.1XXA ABDOMINAL WALL HEMATOMA, INITIAL ENCOUNTER: ICD-10-CM

## 2022-01-01 DIAGNOSIS — E78.2 MIXED HYPERLIPIDEMIA: ICD-10-CM
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
## 2022-01-01 DIAGNOSIS — S09.90XD INJURY OF HEAD, SUBSEQUENT ENCOUNTER: ICD-10-CM

## 2022-01-01 DIAGNOSIS — S60.221A CONTUSION OF MULTIPLE SITES OF RIGHT HAND AND WRIST, INITIAL ENCOUNTER: ICD-10-CM

## 2022-01-01 DIAGNOSIS — R11.0 NAUSEA: ICD-10-CM

## 2022-01-01 DIAGNOSIS — Z79.01 LONG TERM (CURRENT) USE OF ANTICOAGULANTS: ICD-10-CM

## 2022-01-01 LAB
ABO GROUP BLD: NORMAL
ALBUMIN SERPL-MCNC: 3.7 G/DL (ref 3.5–5.2)
ALBUMIN SERPL-MCNC: 3.8 G/DL (ref 3.5–5.2)
ALBUMIN SERPL-MCNC: 4.2 G/DL (ref 3.5–5.2)
ALBUMIN SERPL-MCNC: 4.3 G/DL (ref 3.6–4.6)
ALBUMIN SERPL-MCNC: 4.4 G/DL (ref 3.6–4.6)
ALBUMIN/GLOB SERPL: 1.4 G/DL
ALBUMIN/GLOB SERPL: 1.5 G/DL
ALBUMIN/GLOB SERPL: 1.5 G/DL
ALBUMIN/GLOB SERPL: 1.9 {RATIO} (ref 1.2–2.2)
ALBUMIN/GLOB SERPL: 2.1 {RATIO} (ref 1.2–2.2)
ALP SERPL-CCNC: 105 IU/L (ref 44–121)
ALP SERPL-CCNC: 111 U/L (ref 39–117)
ALP SERPL-CCNC: 152 U/L (ref 39–117)
ALP SERPL-CCNC: 175 U/L (ref 39–117)
ALP SERPL-CCNC: 93 IU/L (ref 44–121)
ALT SERPL W P-5'-P-CCNC: 10 U/L (ref 1–33)
ALT SERPL W P-5'-P-CCNC: 11 U/L (ref 1–33)
ALT SERPL W P-5'-P-CCNC: 8 U/L (ref 1–33)
ALT SERPL-CCNC: 5 IU/L (ref 0–32)
ALT SERPL-CCNC: 5 IU/L (ref 0–32)
ANION GAP SERPL CALCULATED.3IONS-SCNC: 10.9 MMOL/L (ref 5–15)
ANION GAP SERPL CALCULATED.3IONS-SCNC: 11 MMOL/L (ref 5–15)
ANION GAP SERPL CALCULATED.3IONS-SCNC: 5.7 MMOL/L (ref 5–15)
ANION GAP SERPL CALCULATED.3IONS-SCNC: 8.1 MMOL/L (ref 5–15)
ANION GAP SERPL CALCULATED.3IONS-SCNC: 8.7 MMOL/L (ref 5–15)
ANION GAP SERPL CALCULATED.3IONS-SCNC: 8.9 MMOL/L (ref 5–15)
ANION GAP SERPL CALCULATED.3IONS-SCNC: 9.7 MMOL/L (ref 5–15)
AORTIC DIMENSIONLESS INDEX: 0.7 (DI)
APPEARANCE UR: CLEAR
AST SERPL-CCNC: 15 IU/L (ref 0–40)
AST SERPL-CCNC: 19 IU/L (ref 0–40)
AST SERPL-CCNC: 20 U/L (ref 1–32)
AST SERPL-CCNC: 22 U/L (ref 1–32)
AST SERPL-CCNC: 31 U/L (ref 1–32)
BACTERIA #/AREA URNS HPF: ABNORMAL /[HPF]
BACTERIA BLD CULT: ABNORMAL
BACTERIA ID TEST ISLT QL CULT: ABNORMAL
BACTERIA SPEC AEROBE CULT: ABNORMAL
BACTERIA SPEC AEROBE CULT: NORMAL
BACTERIA SPEC AEROBE CULT: NORMAL
BACTERIA UR CULT: NORMAL
BACTERIA UR CULT: NORMAL
BASOPHILS # BLD AUTO: 0.02 10*3/MM3 (ref 0–0.2)
BASOPHILS # BLD AUTO: 0.03 10*3/MM3 (ref 0–0.2)
BASOPHILS # BLD AUTO: 0.04 10*3/MM3 (ref 0–0.2)
BASOPHILS # BLD AUTO: 0.04 10*3/MM3 (ref 0–0.2)
BASOPHILS # BLD AUTO: 0.06 10*3/MM3 (ref 0–0.2)
BASOPHILS # BLD AUTO: 0.1 X10E3/UL (ref 0–0.2)
BASOPHILS NFR BLD AUTO: 0.2 % (ref 0–1.5)
BASOPHILS NFR BLD AUTO: 0.2 % (ref 0–1.5)
BASOPHILS NFR BLD AUTO: 0.3 % (ref 0–1.5)
BASOPHILS NFR BLD AUTO: 0.3 % (ref 0–1.5)
BASOPHILS NFR BLD AUTO: 0.4 % (ref 0–1.5)
BASOPHILS NFR BLD AUTO: 0.4 % (ref 0–1.5)
BASOPHILS NFR BLD AUTO: 0.7 % (ref 0–1.5)
BASOPHILS NFR BLD AUTO: 1 %
BH CV ECHO MEAS - AO MAX PG: 9.7 MMHG
BH CV ECHO MEAS - AO MEAN PG: 5.9 MMHG
BH CV ECHO MEAS - AO ROOT DIAM: 3 CM
BH CV ECHO MEAS - AO V2 MAX: 156.1 CM/SEC
BH CV ECHO MEAS - AO V2 VTI: 30.7 CM
BH CV ECHO MEAS - AVA(I,D): 1.74 CM2
BH CV ECHO MEAS - EDV(CUBED): 67.4 ML
BH CV ECHO MEAS - EDV(MOD-SP2): 85 ML
BH CV ECHO MEAS - EDV(MOD-SP4): 64 ML
BH CV ECHO MEAS - EF(MOD-BP): 52.7 %
BH CV ECHO MEAS - EF(MOD-SP2): 55.3 %
BH CV ECHO MEAS - EF(MOD-SP4): 53.1 %
BH CV ECHO MEAS - ESV(CUBED): 28.5 ML
BH CV ECHO MEAS - ESV(MOD-SP2): 38 ML
BH CV ECHO MEAS - ESV(MOD-SP4): 30 ML
BH CV ECHO MEAS - FS: 24.9 %
BH CV ECHO MEAS - IVS/LVPW: 1.07 CM
BH CV ECHO MEAS - IVSD: 0.97 CM
BH CV ECHO MEAS - LAT PEAK E' VEL: 8.1 CM/SEC
BH CV ECHO MEAS - LV DIASTOLIC VOL/BSA (35-75): 38.6 CM2
BH CV ECHO MEAS - LV MASS(C)D: 119.9 GRAMS
BH CV ECHO MEAS - LV MAX PG: 4.3 MMHG
BH CV ECHO MEAS - LV MEAN PG: 2.6 MMHG
BH CV ECHO MEAS - LV SYSTOLIC VOL/BSA (12-30): 18.1 CM2
BH CV ECHO MEAS - LV V1 MAX: 103.9 CM/SEC
BH CV ECHO MEAS - LV V1 VTI: 21 CM
BH CV ECHO MEAS - LVIDD: 4.1 CM
BH CV ECHO MEAS - LVIDS: 3.1 CM
BH CV ECHO MEAS - LVOT AREA: 2.5 CM2
BH CV ECHO MEAS - LVOT DIAM: 1.8 CM
BH CV ECHO MEAS - LVPWD: 0.91 CM
BH CV ECHO MEAS - MED PEAK E' VEL: 7 CM/SEC
BH CV ECHO MEAS - MV A DUR: 0.11 SEC
BH CV ECHO MEAS - MV A MAX VEL: 86.1 CM/SEC
BH CV ECHO MEAS - MV DEC SLOPE: 409.3 CM/SEC2
BH CV ECHO MEAS - MV DEC TIME: 210 MSEC
BH CV ECHO MEAS - MV E MAX VEL: 81 CM/SEC
BH CV ECHO MEAS - MV E/A: 0.94
BH CV ECHO MEAS - MV MAX PG: 4 MMHG
BH CV ECHO MEAS - MV MEAN PG: 2.3 MMHG
BH CV ECHO MEAS - MV P1/2T: 65.3 MSEC
BH CV ECHO MEAS - MV V2 VTI: 21.6 CM
BH CV ECHO MEAS - MVA(P1/2T): 3.4 CM2
BH CV ECHO MEAS - MVA(VTI): 2.48 CM2
BH CV ECHO MEAS - PA ACC TIME: 0.12 SEC
BH CV ECHO MEAS - PA PR(ACCEL): 25.5 MMHG
BH CV ECHO MEAS - PA V2 MAX: 95.6 CM/SEC
BH CV ECHO MEAS - RAP SYSTOLE: 8 MMHG
BH CV ECHO MEAS - RV MAX PG: 2.2 MMHG
BH CV ECHO MEAS - RV V1 MAX: 74.1 CM/SEC
BH CV ECHO MEAS - RV V1 VTI: 11.7 CM
BH CV ECHO MEAS - RVSP: 40.6 MMHG
BH CV ECHO MEAS - SI(MOD-SP2): 28.4 ML/M2
BH CV ECHO MEAS - SI(MOD-SP4): 20.5 ML/M2
BH CV ECHO MEAS - SV(LVOT): 53.6 ML
BH CV ECHO MEAS - SV(MOD-SP2): 47 ML
BH CV ECHO MEAS - SV(MOD-SP4): 34 ML
BH CV ECHO MEAS - TAPSE (>1.6): 2.4 CM
BH CV ECHO MEAS - TR MAX PG: 32.6 MMHG
BH CV ECHO MEAS - TR MAX VEL: 285.7 CM/SEC
BH CV ECHO MEASUREMENTS AVERAGE E/E' RATIO: 10.73
BH CV XLRA - RV BASE: 3.8 CM
BH CV XLRA - RV MID: 3.1 CM
BH CV XLRA - TDI S': 9.4 CM/SEC
BILIRUB SERPL-MCNC: 0.3 MG/DL (ref 0–1.2)
BILIRUB SERPL-MCNC: 0.3 MG/DL (ref 0–1.2)
BILIRUB SERPL-MCNC: 0.4 MG/DL (ref 0–1.2)
BILIRUB SERPL-MCNC: 0.5 MG/DL (ref 0–1.2)
BILIRUB SERPL-MCNC: <0.2 MG/DL (ref 0–1.2)
BILIRUB UR QL STRIP: NEGATIVE
BLD GP AB SCN SERPL QL: NEGATIVE
BUN SERPL-MCNC: 10 MG/DL (ref 8–23)
BUN SERPL-MCNC: 11 MG/DL (ref 8–23)
BUN SERPL-MCNC: 11 MG/DL (ref 8–23)
BUN SERPL-MCNC: 14 MG/DL (ref 8–23)
BUN SERPL-MCNC: 17 MG/DL (ref 8–23)
BUN SERPL-MCNC: 18 MG/DL (ref 8–23)
BUN SERPL-MCNC: 20 MG/DL (ref 8–23)
BUN SERPL-MCNC: 20 MG/DL (ref 8–27)
BUN SERPL-MCNC: 22 MG/DL (ref 8–27)
BUN/CREAT SERPL: 14.9 (ref 7–25)
BUN/CREAT SERPL: 15.6 (ref 7–25)
BUN/CREAT SERPL: 19 (ref 7–25)
BUN/CREAT SERPL: 19.1 (ref 7–25)
BUN/CREAT SERPL: 19.7 (ref 7–25)
BUN/CREAT SERPL: 20.5 (ref 7–25)
BUN/CREAT SERPL: 22 (ref 12–28)
BUN/CREAT SERPL: 22.5 (ref 7–25)
BUN/CREAT SERPL: 23 (ref 12–28)
CALCIUM SERPL-MCNC: 9.5 MG/DL (ref 8.7–10.3)
CALCIUM SERPL-MCNC: 9.7 MG/DL (ref 8.7–10.3)
CALCIUM SPEC-SCNC: 7.7 MG/DL (ref 8.6–10.5)
CALCIUM SPEC-SCNC: 7.7 MG/DL (ref 8.6–10.5)
CALCIUM SPEC-SCNC: 8 MG/DL (ref 8.6–10.5)
CALCIUM SPEC-SCNC: 8.6 MG/DL (ref 8.6–10.5)
CALCIUM SPEC-SCNC: 9.1 MG/DL (ref 8.6–10.5)
CALCIUM SPEC-SCNC: 9.1 MG/DL (ref 8.6–10.5)
CALCIUM SPEC-SCNC: 9.7 MG/DL (ref 8.6–10.5)
CASTS URNS QL MICRO: ABNORMAL /LPF
CHLORIDE SERPL-SCNC: 101 MMOL/L (ref 98–107)
CHLORIDE SERPL-SCNC: 104 MMOL/L (ref 98–107)
CHLORIDE SERPL-SCNC: 104 MMOL/L (ref 98–107)
CHLORIDE SERPL-SCNC: 105 MMOL/L (ref 96–106)
CHLORIDE SERPL-SCNC: 105 MMOL/L (ref 98–107)
CHLORIDE SERPL-SCNC: 105 MMOL/L (ref 98–107)
CHLORIDE SERPL-SCNC: 107 MMOL/L (ref 96–106)
CHLORIDE SERPL-SCNC: 107 MMOL/L (ref 98–107)
CHLORIDE SERPL-SCNC: 107 MMOL/L (ref 98–107)
CHOLEST SERPL-MCNC: 226 MG/DL (ref 100–199)
CHOLEST SERPL-MCNC: 251 MG/DL (ref 100–199)
CHOLEST/HDLC SERPL: 3.8 RATIO (ref 0–4.4)
CHOLEST/HDLC SERPL: 3.9 RATIO (ref 0–4.4)
CK SERPL-CCNC: 203 U/L (ref 20–180)
CLARITY UR: CLEAR
CO2 SERPL-SCNC: 23 MMOL/L (ref 20–29)
CO2 SERPL-SCNC: 23 MMOL/L (ref 22–29)
CO2 SERPL-SCNC: 24 MMOL/L (ref 20–29)
CO2 SERPL-SCNC: 24.3 MMOL/L (ref 22–29)
CO2 SERPL-SCNC: 24.9 MMOL/L (ref 22–29)
CO2 SERPL-SCNC: 27.1 MMOL/L (ref 22–29)
CO2 SERPL-SCNC: 28.3 MMOL/L (ref 22–29)
CO2 SERPL-SCNC: 29.1 MMOL/L (ref 22–29)
CO2 SERPL-SCNC: 32.3 MMOL/L (ref 22–29)
COLOR UR: YELLOW
CREAT SERPL-MCNC: 0.58 MG/DL (ref 0.57–1)
CREAT SERPL-MCNC: 0.64 MG/DL (ref 0.57–1)
CREAT SERPL-MCNC: 0.71 MG/DL (ref 0.57–1)
CREAT SERPL-MCNC: 0.74 MG/DL (ref 0.57–1)
CREAT SERPL-MCNC: 0.83 MG/DL (ref 0.57–1)
CREAT SERPL-MCNC: 0.89 MG/DL (ref 0.57–1)
CREAT SERPL-MCNC: 0.91 MG/DL (ref 0.57–1)
CREAT SERPL-MCNC: 0.94 MG/DL (ref 0.57–1)
CREAT SERPL-MCNC: 0.97 MG/DL (ref 0.57–1)
D-LACTATE SERPL-SCNC: 1 MMOL/L (ref 0.5–2)
D-LACTATE SERPL-SCNC: 1.9 MMOL/L (ref 0.5–2)
D-LACTATE SERPL-SCNC: 2.9 MMOL/L (ref 0.5–2)
DEPRECATED RDW RBC AUTO: 41.5 FL (ref 37–54)
DEPRECATED RDW RBC AUTO: 41.5 FL (ref 37–54)
DEPRECATED RDW RBC AUTO: 42.7 FL (ref 37–54)
DEPRECATED RDW RBC AUTO: 44.3 FL (ref 37–54)
DEPRECATED RDW RBC AUTO: 45.2 FL (ref 37–54)
DEPRECATED RDW RBC AUTO: 45.5 FL (ref 37–54)
DEPRECATED RDW RBC AUTO: 47.6 FL (ref 37–54)
EGFR GENE MUT ANL BLD/T: 57 ML/MIN/1.73
EGFRCR SERPLBLD CKD-EPI 2021: 59.6 ML/MIN/1.73
EGFRCR SERPLBLD CKD-EPI 2021: 62 ML/MIN/1.73
EGFRCR SERPLBLD CKD-EPI 2021: 63.6 ML/MIN/1.73
EGFRCR SERPLBLD CKD-EPI 2021: 69.2 ML/MIN/1.73
EGFRCR SERPLBLD CKD-EPI 2021: 79.4 ML/MIN/1.73
EGFRCR SERPLBLD CKD-EPI 2021: 83.4 ML/MIN/1.73
EGFRCR SERPLBLD CKD-EPI 2021: 86.7 ML/MIN/1.73
EGFRCR SERPLBLD CKD-EPI 2021: 88.8 ML/MIN/1.73
EOSINOPHIL # BLD AUTO: 0 10*3/MM3 (ref 0–0.4)
EOSINOPHIL # BLD AUTO: 0.01 10*3/MM3 (ref 0–0.4)
EOSINOPHIL # BLD AUTO: 0.01 10*3/MM3 (ref 0–0.4)
EOSINOPHIL # BLD AUTO: 0.07 10*3/MM3 (ref 0–0.4)
EOSINOPHIL # BLD AUTO: 0.09 10*3/MM3 (ref 0–0.4)
EOSINOPHIL # BLD AUTO: 0.1 10*3/MM3 (ref 0–0.4)
EOSINOPHIL # BLD AUTO: 0.1 X10E3/UL (ref 0–0.4)
EOSINOPHIL # BLD AUTO: 0.16 10*3/MM3 (ref 0–0.4)
EOSINOPHIL NFR BLD AUTO: 0 % (ref 0.3–6.2)
EOSINOPHIL NFR BLD AUTO: 0.1 % (ref 0.3–6.2)
EOSINOPHIL NFR BLD AUTO: 0.1 % (ref 0.3–6.2)
EOSINOPHIL NFR BLD AUTO: 0.7 % (ref 0.3–6.2)
EOSINOPHIL NFR BLD AUTO: 1 %
EOSINOPHIL NFR BLD AUTO: 1 % (ref 0.3–6.2)
EOSINOPHIL NFR BLD AUTO: 1.1 % (ref 0.3–6.2)
EOSINOPHIL NFR BLD AUTO: 1.8 % (ref 0.3–6.2)
EPI CELLS #/AREA URNS HPF: ABNORMAL /HPF (ref 0–10)
ERYTHROCYTE [DISTWIDTH] IN BLOOD BY AUTOMATED COUNT: 12.5 % (ref 11.7–15.4)
ERYTHROCYTE [DISTWIDTH] IN BLOOD BY AUTOMATED COUNT: 12.5 % (ref 12.3–15.4)
ERYTHROCYTE [DISTWIDTH] IN BLOOD BY AUTOMATED COUNT: 12.6 % (ref 12.3–15.4)
ERYTHROCYTE [DISTWIDTH] IN BLOOD BY AUTOMATED COUNT: 12.6 % (ref 12.3–15.4)
ERYTHROCYTE [DISTWIDTH] IN BLOOD BY AUTOMATED COUNT: 12.7 % (ref 12.3–15.4)
ERYTHROCYTE [DISTWIDTH] IN BLOOD BY AUTOMATED COUNT: 13 % (ref 12.3–15.4)
ERYTHROCYTE [DISTWIDTH] IN BLOOD BY AUTOMATED COUNT: 13.1 % (ref 12.3–15.4)
ERYTHROCYTE [DISTWIDTH] IN BLOOD BY AUTOMATED COUNT: 13.1 % (ref 12.3–15.4)
GLOBULIN SER CALC-MCNC: 2.1 G/DL (ref 1.5–4.5)
GLOBULIN SER CALC-MCNC: 2.3 G/DL (ref 1.5–4.5)
GLOBULIN UR ELPH-MCNC: 2.5 GM/DL
GLOBULIN UR ELPH-MCNC: 2.6 GM/DL
GLOBULIN UR ELPH-MCNC: 2.9 GM/DL
GLUCOSE SERPL-MCNC: 100 MG/DL (ref 65–99)
GLUCOSE SERPL-MCNC: 123 MG/DL (ref 65–99)
GLUCOSE SERPL-MCNC: 147 MG/DL (ref 65–99)
GLUCOSE SERPL-MCNC: 81 MG/DL (ref 65–99)
GLUCOSE SERPL-MCNC: 87 MG/DL (ref 65–99)
GLUCOSE SERPL-MCNC: 89 MG/DL (ref 65–99)
GLUCOSE SERPL-MCNC: 90 MG/DL (ref 65–99)
GLUCOSE SERPL-MCNC: 91 MG/DL (ref 65–99)
GLUCOSE SERPL-MCNC: 93 MG/DL (ref 65–99)
GLUCOSE UR QL STRIP: NEGATIVE
GLUCOSE UR STRIP-MCNC: NEGATIVE MG/DL
GRAM STN SPEC: ABNORMAL
GRAM STN SPEC: ABNORMAL
HCT VFR BLD AUTO: 27.6 % (ref 34–46.6)
HCT VFR BLD AUTO: 29 % (ref 34–46.6)
HCT VFR BLD AUTO: 29.2 % (ref 34–46.6)
HCT VFR BLD AUTO: 31.2 % (ref 34–46.6)
HCT VFR BLD AUTO: 34.2 % (ref 34–46.6)
HCT VFR BLD AUTO: 40 % (ref 34–46.6)
HCT VFR BLD AUTO: 41.6 % (ref 34–46.6)
HCT VFR BLD AUTO: 41.7 % (ref 34–46.6)
HDLC SERPL-MCNC: 59 MG/DL
HDLC SERPL-MCNC: 64 MG/DL
HGB BLD-MCNC: 10.3 G/DL (ref 12–15.9)
HGB BLD-MCNC: 10.9 G/DL (ref 12–15.9)
HGB BLD-MCNC: 13.2 G/DL (ref 12–15.9)
HGB BLD-MCNC: 13.2 G/DL (ref 12–15.9)
HGB BLD-MCNC: 13.4 G/DL (ref 11.1–15.9)
HGB BLD-MCNC: 9 G/DL (ref 12–15.9)
HGB BLD-MCNC: 9.3 G/DL (ref 12–15.9)
HGB BLD-MCNC: 9.7 G/DL (ref 12–15.9)
HGB UR QL STRIP.AUTO: NEGATIVE
HGB UR QL STRIP: NEGATIVE
IMM GRANULOCYTES # BLD AUTO: 0 X10E3/UL (ref 0–0.1)
IMM GRANULOCYTES # BLD AUTO: 0.04 10*3/MM3 (ref 0–0.05)
IMM GRANULOCYTES # BLD AUTO: 0.04 10*3/MM3 (ref 0–0.05)
IMM GRANULOCYTES # BLD AUTO: 0.05 10*3/MM3 (ref 0–0.05)
IMM GRANULOCYTES # BLD AUTO: 0.06 10*3/MM3 (ref 0–0.05)
IMM GRANULOCYTES # BLD AUTO: 0.06 10*3/MM3 (ref 0–0.05)
IMM GRANULOCYTES # BLD AUTO: 0.07 10*3/MM3 (ref 0–0.05)
IMM GRANULOCYTES # BLD AUTO: 0.07 10*3/MM3 (ref 0–0.05)
IMM GRANULOCYTES NFR BLD AUTO: 0 %
IMM GRANULOCYTES NFR BLD AUTO: 0.4 % (ref 0–0.5)
IMM GRANULOCYTES NFR BLD AUTO: 0.4 % (ref 0–0.5)
IMM GRANULOCYTES NFR BLD AUTO: 0.5 % (ref 0–0.5)
IMM GRANULOCYTES NFR BLD AUTO: 0.5 % (ref 0–0.5)
IMM GRANULOCYTES NFR BLD AUTO: 0.6 % (ref 0–0.5)
IMM GRANULOCYTES NFR BLD AUTO: 0.7 % (ref 0–0.5)
IMM GRANULOCYTES NFR BLD AUTO: 0.7 % (ref 0–0.5)
IRON 24H UR-MRATE: 19 MCG/DL (ref 37–145)
IRON SATN MFR SERPL: 11 % (ref 20–50)
ISOLATED FROM: ABNORMAL
KETONES UR QL STRIP: ABNORMAL
KETONES UR QL STRIP: NEGATIVE
LDLC SERPL CALC-MCNC: 151 MG/DL (ref 0–99)
LDLC SERPL CALC-MCNC: 167 MG/DL (ref 0–99)
LEFT ATRIUM VOLUME INDEX: 24.3 ML/M2
LEUKOCYTE ESTERASE UR QL STRIP.AUTO: NEGATIVE
LEUKOCYTE ESTERASE UR QL STRIP: ABNORMAL
LIPASE SERPL-CCNC: 60 U/L (ref 13–60)
LYMPHOCYTES # BLD AUTO: 0.76 10*3/MM3 (ref 0.7–3.1)
LYMPHOCYTES # BLD AUTO: 0.93 10*3/MM3 (ref 0.7–3.1)
LYMPHOCYTES # BLD AUTO: 0.95 10*3/MM3 (ref 0.7–3.1)
LYMPHOCYTES # BLD AUTO: 0.96 10*3/MM3 (ref 0.7–3.1)
LYMPHOCYTES # BLD AUTO: 1.06 10*3/MM3 (ref 0.7–3.1)
LYMPHOCYTES # BLD AUTO: 1.26 10*3/MM3 (ref 0.7–3.1)
LYMPHOCYTES # BLD AUTO: 1.37 10*3/MM3 (ref 0.7–3.1)
LYMPHOCYTES # BLD AUTO: 1.6 X10E3/UL (ref 0.7–3.1)
LYMPHOCYTES NFR BLD AUTO: 10.5 % (ref 19.6–45.3)
LYMPHOCYTES NFR BLD AUTO: 11 % (ref 19.6–45.3)
LYMPHOCYTES NFR BLD AUTO: 14.2 % (ref 19.6–45.3)
LYMPHOCYTES NFR BLD AUTO: 15.3 % (ref 19.6–45.3)
LYMPHOCYTES NFR BLD AUTO: 18 %
LYMPHOCYTES NFR BLD AUTO: 7 % (ref 19.6–45.3)
LYMPHOCYTES NFR BLD AUTO: 7.3 % (ref 19.6–45.3)
LYMPHOCYTES NFR BLD AUTO: 9.2 % (ref 19.6–45.3)
MAXIMAL PREDICTED HEART RATE: 135 BPM
MCH RBC QN AUTO: 29.9 PG (ref 26.6–33)
MCH RBC QN AUTO: 30.1 PG (ref 26.6–33)
MCH RBC QN AUTO: 30.2 PG (ref 26.6–33)
MCH RBC QN AUTO: 30.3 PG (ref 26.6–33)
MCH RBC QN AUTO: 30.4 PG (ref 26.6–33)
MCH RBC QN AUTO: 30.6 PG (ref 26.6–33)
MCH RBC QN AUTO: 31.2 PG (ref 26.6–33)
MCH RBC QN AUTO: 31.3 PG (ref 26.6–33)
MCHC RBC AUTO-ENTMCNC: 31.7 G/DL (ref 31.5–35.7)
MCHC RBC AUTO-ENTMCNC: 31.8 G/DL (ref 31.5–35.7)
MCHC RBC AUTO-ENTMCNC: 31.9 G/DL (ref 31.5–35.7)
MCHC RBC AUTO-ENTMCNC: 32.2 G/DL (ref 31.5–35.7)
MCHC RBC AUTO-ENTMCNC: 32.6 G/DL (ref 31.5–35.7)
MCHC RBC AUTO-ENTMCNC: 33 G/DL (ref 31.5–35.7)
MCHC RBC AUTO-ENTMCNC: 33 G/DL (ref 31.5–35.7)
MCHC RBC AUTO-ENTMCNC: 33.4 G/DL (ref 31.5–35.7)
MCV RBC AUTO: 90.6 FL (ref 79–97)
MCV RBC AUTO: 90.7 FL (ref 79–97)
MCV RBC AUTO: 92.3 FL (ref 79–97)
MCV RBC AUTO: 94 FL (ref 79–97)
MCV RBC AUTO: 94.8 FL (ref 79–97)
MCV RBC AUTO: 95.3 FL (ref 79–97)
MCV RBC AUTO: 96.1 FL (ref 79–97)
MCV RBC AUTO: 98.6 FL (ref 79–97)
MICRO URNS: ABNORMAL
MONOCYTES # BLD AUTO: 0.6 X10E3/UL (ref 0.1–0.9)
MONOCYTES # BLD AUTO: 0.74 10*3/MM3 (ref 0.1–0.9)
MONOCYTES # BLD AUTO: 0.75 10*3/MM3 (ref 0.1–0.9)
MONOCYTES # BLD AUTO: 0.93 10*3/MM3 (ref 0.1–0.9)
MONOCYTES # BLD AUTO: 0.94 10*3/MM3 (ref 0.1–0.9)
MONOCYTES # BLD AUTO: 1.02 10*3/MM3 (ref 0.1–0.9)
MONOCYTES # BLD AUTO: 1.02 10*3/MM3 (ref 0.1–0.9)
MONOCYTES # BLD AUTO: 1.18 10*3/MM3 (ref 0.1–0.9)
MONOCYTES NFR BLD AUTO: 10.1 % (ref 5–12)
MONOCYTES NFR BLD AUTO: 10.2 % (ref 5–12)
MONOCYTES NFR BLD AUTO: 6 %
MONOCYTES NFR BLD AUTO: 7.7 % (ref 5–12)
MONOCYTES NFR BLD AUTO: 8.7 % (ref 5–12)
MONOCYTES NFR BLD AUTO: 9.1 % (ref 5–12)
MONOCYTES NFR BLD AUTO: 9.8 % (ref 5–12)
MONOCYTES NFR BLD AUTO: 9.8 % (ref 5–12)
NEUTROPHILS # BLD AUTO: 6.3 X10E3/UL (ref 1.4–7)
NEUTROPHILS NFR BLD AUTO: 11.42 10*3/MM3 (ref 1.7–7)
NEUTROPHILS NFR BLD AUTO: 6.06 10*3/MM3 (ref 1.7–7)
NEUTROPHILS NFR BLD AUTO: 6.97 10*3/MM3 (ref 1.7–7)
NEUTROPHILS NFR BLD AUTO: 7.35 10*3/MM3 (ref 1.7–7)
NEUTROPHILS NFR BLD AUTO: 7.43 10*3/MM3 (ref 1.7–7)
NEUTROPHILS NFR BLD AUTO: 73.3 % (ref 42.7–76)
NEUTROPHILS NFR BLD AUTO: 74 %
NEUTROPHILS NFR BLD AUTO: 76.4 % (ref 42.7–76)
NEUTROPHILS NFR BLD AUTO: 76.6 % (ref 42.7–76)
NEUTROPHILS NFR BLD AUTO: 77.5 % (ref 42.7–76)
NEUTROPHILS NFR BLD AUTO: 79.5 % (ref 42.7–76)
NEUTROPHILS NFR BLD AUTO: 8.01 10*3/MM3 (ref 1.7–7)
NEUTROPHILS NFR BLD AUTO: 8.52 10*3/MM3 (ref 1.7–7)
NEUTROPHILS NFR BLD AUTO: 82 % (ref 42.7–76)
NEUTROPHILS NFR BLD AUTO: 83.7 % (ref 42.7–76)
NITRITE UR QL STRIP: NEGATIVE
NRBC BLD AUTO-RTO: 0 /100 WBC (ref 0–0.2)
NRBC BLD AUTO-RTO: 0.1 /100 WBC (ref 0–0.2)
PH UR STRIP.AUTO: 5.5 [PH] (ref 5–8)
PH UR STRIP.AUTO: 7.5 [PH] (ref 5–8)
PH UR STRIP.AUTO: 7.5 [PH] (ref 5–8)
PH UR STRIP: 5 [PH] (ref 5–7.5)
PLATELET # BLD AUTO: 215 10*3/MM3 (ref 140–450)
PLATELET # BLD AUTO: 237 10*3/MM3 (ref 140–450)
PLATELET # BLD AUTO: 261 10*3/MM3 (ref 140–450)
PLATELET # BLD AUTO: 267 10*3/MM3 (ref 140–450)
PLATELET # BLD AUTO: 285 10*3/MM3 (ref 140–450)
PLATELET # BLD AUTO: 322 X10E3/UL (ref 150–450)
PLATELET # BLD AUTO: 361 10*3/MM3 (ref 140–450)
PLATELET # BLD AUTO: 366 10*3/MM3 (ref 140–450)
PMV BLD AUTO: 10.2 FL (ref 6–12)
PMV BLD AUTO: 10.2 FL (ref 6–12)
PMV BLD AUTO: 10.4 FL (ref 6–12)
PMV BLD AUTO: 10.7 FL (ref 6–12)
PMV BLD AUTO: 9.6 FL (ref 6–12)
POTASSIUM SERPL-SCNC: 3.5 MMOL/L (ref 3.5–5.2)
POTASSIUM SERPL-SCNC: 4 MMOL/L (ref 3.5–5.2)
POTASSIUM SERPL-SCNC: 4.1 MMOL/L (ref 3.5–5.2)
POTASSIUM SERPL-SCNC: 4.4 MMOL/L (ref 3.5–5.2)
POTASSIUM SERPL-SCNC: 4.5 MMOL/L (ref 3.5–5.2)
POTASSIUM SERPL-SCNC: 4.5 MMOL/L (ref 3.5–5.2)
POTASSIUM SERPL-SCNC: 4.6 MMOL/L (ref 3.5–5.2)
PROCALCITONIN SERPL-MCNC: 0.13 NG/ML (ref 0–0.25)
PROT SERPL-MCNC: 6.2 G/DL (ref 6–8.5)
PROT SERPL-MCNC: 6.4 G/DL (ref 6–8.5)
PROT SERPL-MCNC: 6.5 G/DL (ref 6–8.5)
PROT SERPL-MCNC: 6.6 G/DL (ref 6–8.5)
PROT SERPL-MCNC: 7.1 G/DL (ref 6–8.5)
PROT UR QL STRIP: NEGATIVE
QT INTERVAL: 415 MS
RBC # BLD AUTO: 2.99 10*6/MM3 (ref 3.77–5.28)
RBC # BLD AUTO: 3.04 10*6/MM3 (ref 3.77–5.28)
RBC # BLD AUTO: 3.2 10*6/MM3 (ref 3.77–5.28)
RBC # BLD AUTO: 3.44 10*6/MM3 (ref 3.77–5.28)
RBC # BLD AUTO: 3.59 10*6/MM3 (ref 3.77–5.28)
RBC # BLD AUTO: 4.22 10*6/MM3 (ref 3.77–5.28)
RBC # BLD AUTO: 4.23 10*6/MM3 (ref 3.77–5.28)
RBC # BLD AUTO: 4.43 X10E6/UL (ref 3.77–5.28)
RBC #/AREA URNS HPF: ABNORMAL /HPF (ref 0–2)
RH BLD: POSITIVE
SODIUM SERPL-SCNC: 138 MMOL/L (ref 136–145)
SODIUM SERPL-SCNC: 139 MMOL/L (ref 136–145)
SODIUM SERPL-SCNC: 140 MMOL/L (ref 136–145)
SODIUM SERPL-SCNC: 141 MMOL/L (ref 136–145)
SODIUM SERPL-SCNC: 142 MMOL/L (ref 136–145)
SODIUM SERPL-SCNC: 142 MMOL/L (ref 136–145)
SODIUM SERPL-SCNC: 143 MMOL/L (ref 136–145)
SODIUM SERPL-SCNC: 144 MMOL/L (ref 134–144)
SODIUM SERPL-SCNC: 146 MMOL/L (ref 134–144)
SP GR UR STRIP: 1.01 (ref 1–1.03)
SP GR UR STRIP: 1.02 (ref 1–1.03)
STRESS TARGET HR: 115 BPM
T&S EXPIRATION DATE: NORMAL
T4 FREE SERPL-MCNC: 1.24 NG/DL (ref 0.82–1.77)
TIBC SERPL-MCNC: 177 MCG/DL (ref 298–536)
TRANSFERRIN SERPL-MCNC: 119 MG/DL (ref 200–360)
TRIGL SERPL-MCNC: 116 MG/DL (ref 0–149)
TRIGL SERPL-MCNC: 89 MG/DL (ref 0–149)
TSH SERPL DL<=0.005 MIU/L-ACNC: 0.99 UIU/ML (ref 0.45–4.5)
URINALYSIS REFLEX: ABNORMAL
UROBILINOGEN UR QL STRIP: ABNORMAL
UROBILINOGEN UR QL STRIP: NORMAL
UROBILINOGEN UR QL STRIP: NORMAL
UROBILINOGEN UR STRIP-MCNC: 0.2 MG/DL (ref 0.2–1)
VLDLC SERPL CALC-MCNC: 16 MG/DL (ref 5–40)
VLDLC SERPL CALC-MCNC: 20 MG/DL (ref 5–40)
WBC # BLD AUTO: 8.6 X10E3/UL (ref 3.4–10.8)
WBC #/AREA URNS HPF: >30 /HPF (ref 0–5)
WBC NRBC COR # BLD: 10.08 10*3/MM3 (ref 3.4–10.8)
WBC NRBC COR # BLD: 10.39 10*3/MM3 (ref 3.4–10.8)
WBC NRBC COR # BLD: 13.64 10*3/MM3 (ref 3.4–10.8)
WBC NRBC COR # BLD: 8.26 10*3/MM3 (ref 3.4–10.8)
WBC NRBC COR # BLD: 9.11 10*3/MM3 (ref 3.4–10.8)
WBC NRBC COR # BLD: 9.6 10*3/MM3 (ref 3.4–10.8)
WBC NRBC COR # BLD: 9.63 10*3/MM3 (ref 3.4–10.8)

## 2022-01-01 PROCEDURE — 99204 OFFICE O/P NEW MOD 45 MIN: CPT | Performed by: INTERNAL MEDICINE

## 2022-01-01 PROCEDURE — 73502 X-RAY EXAM HIP UNI 2-3 VIEWS: CPT

## 2022-01-01 PROCEDURE — 25010000002 MORPHINE PER 10 MG: Performed by: HOSPITALIST

## 2022-01-01 PROCEDURE — 70450 CT HEAD/BRAIN W/O DYE: CPT

## 2022-01-01 PROCEDURE — 25010000002 LORAZEPAM PER 2 MG: Performed by: HOSPITALIST

## 2022-01-01 PROCEDURE — 99284 EMERGENCY DEPT VISIT MOD MDM: CPT

## 2022-01-01 PROCEDURE — 73700 CT LOWER EXTREMITY W/O DYE: CPT

## 2022-01-01 PROCEDURE — 80053 COMPREHEN METABOLIC PANEL: CPT | Performed by: EMERGENCY MEDICINE

## 2022-01-01 PROCEDURE — 85025 COMPLETE CBC W/AUTO DIFF WBC: CPT | Performed by: INTERNAL MEDICINE

## 2022-01-01 PROCEDURE — 1159F MED LIST DOCD IN RCRD: CPT | Performed by: NURSE PRACTITIONER

## 2022-01-01 PROCEDURE — 83540 ASSAY OF IRON: CPT | Performed by: INTERNAL MEDICINE

## 2022-01-01 PROCEDURE — 73130 X-RAY EXAM OF HAND: CPT

## 2022-01-01 PROCEDURE — 80048 BASIC METABOLIC PNL TOTAL CA: CPT | Performed by: INTERNAL MEDICINE

## 2022-01-01 PROCEDURE — 25010000002 CEFAZOLIN IN DEXTROSE 2-4 GM/100ML-% SOLUTION: Performed by: INTERNAL MEDICINE

## 2022-01-01 PROCEDURE — 99213 OFFICE O/P EST LOW 20 MIN: CPT | Performed by: NURSE PRACTITIONER

## 2022-01-01 PROCEDURE — 85025 COMPLETE CBC W/AUTO DIFF WBC: CPT | Performed by: PHYSICIAN ASSISTANT

## 2022-01-01 PROCEDURE — 73110 X-RAY EXAM OF WRIST: CPT

## 2022-01-01 PROCEDURE — 87186 SC STD MICRODIL/AGAR DIL: CPT | Performed by: PHYSICIAN ASSISTANT

## 2022-01-01 PROCEDURE — 25010000002 PIPERACILLIN SOD-TAZOBACTAM PER 1 G: Performed by: PHYSICIAN ASSISTANT

## 2022-01-01 PROCEDURE — 1170F FXNL STATUS ASSESSED: CPT | Performed by: NURSE PRACTITIONER

## 2022-01-01 PROCEDURE — 25010000002 CEFTRIAXONE PER 250 MG: Performed by: INTERNAL MEDICINE

## 2022-01-01 PROCEDURE — 25010000002 VANCOMYCIN 10 G RECONSTITUTED SOLUTION: Performed by: PHYSICIAN ASSISTANT

## 2022-01-01 PROCEDURE — 25010000002 HYDROMORPHONE 1 MG/ML SOLUTION: Performed by: HOSPITALIST

## 2022-01-01 PROCEDURE — 25010000002 PERFLUTREN (DEFINITY) 8.476 MG IN SODIUM CHLORIDE (PF) 0.9 % 10 ML INJECTION: Performed by: INTERNAL MEDICINE

## 2022-01-01 PROCEDURE — 72125 CT NECK SPINE W/O DYE: CPT

## 2022-01-01 PROCEDURE — 81003 URINALYSIS AUTO W/O SCOPE: CPT | Performed by: PHYSICIAN ASSISTANT

## 2022-01-01 PROCEDURE — 83605 ASSAY OF LACTIC ACID: CPT | Performed by: PHYSICIAN ASSISTANT

## 2022-01-01 PROCEDURE — 87076 CULTURE ANAEROBE IDENT EACH: CPT | Performed by: PHYSICIAN ASSISTANT

## 2022-01-01 PROCEDURE — 93000 ELECTROCARDIOGRAM COMPLETE: CPT | Performed by: NURSE PRACTITIONER

## 2022-01-01 PROCEDURE — 80053 COMPREHEN METABOLIC PANEL: CPT | Performed by: PHYSICIAN ASSISTANT

## 2022-01-01 PROCEDURE — 71045 X-RAY EXAM CHEST 1 VIEW: CPT

## 2022-01-01 PROCEDURE — 99214 OFFICE O/P EST MOD 30 MIN: CPT | Performed by: NURSE PRACTITIONER

## 2022-01-01 PROCEDURE — 25010000002 ZIPRASIDONE MESYLATE PER 10 MG

## 2022-01-01 PROCEDURE — 86850 RBC ANTIBODY SCREEN: CPT | Performed by: PHYSICIAN ASSISTANT

## 2022-01-01 PROCEDURE — 0HQ1XZZ REPAIR FACE SKIN, EXTERNAL APPROACH: ICD-10-PCS | Performed by: INTERNAL MEDICINE

## 2022-01-01 PROCEDURE — 99283 EMERGENCY DEPT VISIT LOW MDM: CPT

## 2022-01-01 PROCEDURE — 81003 URINALYSIS AUTO W/O SCOPE: CPT | Performed by: EMERGENCY MEDICINE

## 2022-01-01 PROCEDURE — 99232 SBSQ HOSP IP/OBS MODERATE 35: CPT | Performed by: INTERNAL MEDICINE

## 2022-01-01 PROCEDURE — G0378 HOSPITAL OBSERVATION PER HR: HCPCS

## 2022-01-01 PROCEDURE — 85025 COMPLETE CBC W/AUTO DIFF WBC: CPT | Performed by: EMERGENCY MEDICINE

## 2022-01-01 PROCEDURE — 86901 BLOOD TYPING SEROLOGIC RH(D): CPT | Performed by: PHYSICIAN ASSISTANT

## 2022-01-01 PROCEDURE — 83605 ASSAY OF LACTIC ACID: CPT | Performed by: EMERGENCY MEDICINE

## 2022-01-01 PROCEDURE — 73090 X-RAY EXAM OF FOREARM: CPT

## 2022-01-01 PROCEDURE — 82550 ASSAY OF CK (CPK): CPT | Performed by: PHYSICIAN ASSISTANT

## 2022-01-01 PROCEDURE — P9612 CATHETERIZE FOR URINE SPEC: HCPCS

## 2022-01-01 PROCEDURE — 86900 BLOOD TYPING SEROLOGIC ABO: CPT | Performed by: PHYSICIAN ASSISTANT

## 2022-01-01 PROCEDURE — 84466 ASSAY OF TRANSFERRIN: CPT | Performed by: INTERNAL MEDICINE

## 2022-01-01 PROCEDURE — 0051A COVID-19 (PFIZER) 12+ YRS: CPT | Performed by: NURSE PRACTITIONER

## 2022-01-01 PROCEDURE — 91305 COVID-19 (PFIZER) 12+ YRS: CPT | Performed by: NURSE PRACTITIONER

## 2022-01-01 PROCEDURE — 87150 DNA/RNA AMPLIFIED PROBE: CPT | Performed by: PHYSICIAN ASSISTANT

## 2022-01-01 PROCEDURE — 87040 BLOOD CULTURE FOR BACTERIA: CPT | Performed by: INTERNAL MEDICINE

## 2022-01-01 PROCEDURE — 74177 CT ABD & PELVIS W/CONTRAST: CPT

## 2022-01-01 PROCEDURE — 83690 ASSAY OF LIPASE: CPT | Performed by: EMERGENCY MEDICINE

## 2022-01-01 PROCEDURE — 96372 THER/PROPH/DIAG INJ SC/IM: CPT

## 2022-01-01 PROCEDURE — 36415 COLL VENOUS BLD VENIPUNCTURE: CPT | Performed by: PHYSICIAN ASSISTANT

## 2022-01-01 PROCEDURE — G0439 PPPS, SUBSEQ VISIT: HCPCS | Performed by: NURSE PRACTITIONER

## 2022-01-01 PROCEDURE — 93306 TTE W/DOPPLER COMPLETE: CPT | Performed by: INTERNAL MEDICINE

## 2022-01-01 PROCEDURE — 36415 COLL VENOUS BLD VENIPUNCTURE: CPT

## 2022-01-01 PROCEDURE — 99215 OFFICE O/P EST HI 40 MIN: CPT | Performed by: NURSE PRACTITIONER

## 2022-01-01 PROCEDURE — 70486 CT MAXILLOFACIAL W/O DYE: CPT

## 2022-01-01 PROCEDURE — 84145 PROCALCITONIN (PCT): CPT | Performed by: PHYSICIAN ASSISTANT

## 2022-01-01 PROCEDURE — 25010000002 IOPAMIDOL 61 % SOLUTION: Performed by: EMERGENCY MEDICINE

## 2022-01-01 PROCEDURE — 73030 X-RAY EXAM OF SHOULDER: CPT

## 2022-01-01 PROCEDURE — 93306 TTE W/DOPPLER COMPLETE: CPT

## 2022-01-01 PROCEDURE — 87040 BLOOD CULTURE FOR BACTERIA: CPT | Performed by: PHYSICIAN ASSISTANT

## 2022-01-01 PROCEDURE — 73070 X-RAY EXAM OF ELBOW: CPT

## 2022-01-01 PROCEDURE — 93010 ELECTROCARDIOGRAM REPORT: CPT | Performed by: STUDENT IN AN ORGANIZED HEALTH CARE EDUCATION/TRAINING PROGRAM

## 2022-01-01 PROCEDURE — 87147 CULTURE TYPE IMMUNOLOGIC: CPT | Performed by: PHYSICIAN ASSISTANT

## 2022-01-01 PROCEDURE — 93005 ELECTROCARDIOGRAM TRACING: CPT | Performed by: PHYSICIAN ASSISTANT

## 2022-01-01 PROCEDURE — 73060 X-RAY EXAM OF HUMERUS: CPT

## 2022-01-01 RX ORDER — LORAZEPAM 2 MG/ML
2 INJECTION INTRAMUSCULAR
Status: CANCELLED | OUTPATIENT
Start: 2022-01-01 | End: 2022-12-12

## 2022-01-01 RX ORDER — LORAZEPAM 2 MG/ML
2 INJECTION INTRAMUSCULAR
Status: DISCONTINUED | OUTPATIENT
Start: 2022-01-01 | End: 2022-01-01 | Stop reason: HOSPADM

## 2022-01-01 RX ORDER — PROMETHAZINE HYDROCHLORIDE 12.5 MG/1
12.5 SUPPOSITORY RECTAL EVERY 4 HOURS PRN
Status: CANCELLED | OUTPATIENT
Start: 2022-01-01

## 2022-01-01 RX ORDER — MORPHINE SULFATE 20 MG/ML
5 SOLUTION ORAL
Status: DISCONTINUED | OUTPATIENT
Start: 2022-01-01 | End: 2022-01-01 | Stop reason: HOSPADM

## 2022-01-01 RX ORDER — ACETAMINOPHEN 160 MG/5ML
650 SOLUTION ORAL EVERY 4 HOURS PRN
Status: CANCELLED | OUTPATIENT
Start: 2022-01-01

## 2022-01-01 RX ORDER — ACETAMINOPHEN 650 MG/1
650 SUPPOSITORY RECTAL EVERY 4 HOURS PRN
Status: CANCELLED | OUTPATIENT
Start: 2022-01-01

## 2022-01-01 RX ORDER — ACETAMINOPHEN 650 MG/1
650 SUPPOSITORY RECTAL EVERY 4 HOURS PRN
Status: DISCONTINUED | OUTPATIENT
Start: 2022-01-01 | End: 2022-01-01 | Stop reason: HOSPADM

## 2022-01-01 RX ORDER — ONDANSETRON 2 MG/ML
4 INJECTION INTRAMUSCULAR; INTRAVENOUS EVERY 6 HOURS PRN
Status: DISCONTINUED | OUTPATIENT
Start: 2022-01-01 | End: 2022-01-01

## 2022-01-01 RX ORDER — LORAZEPAM 2 MG/ML
1 INJECTION INTRAMUSCULAR
Status: DISCONTINUED | OUTPATIENT
Start: 2022-01-01 | End: 2022-01-01 | Stop reason: HOSPADM

## 2022-01-01 RX ORDER — HYDROMORPHONE HCL 110MG/55ML
1.5 PATIENT CONTROLLED ANALGESIA SYRINGE INTRAVENOUS
Status: DISCONTINUED | OUTPATIENT
Start: 2022-01-01 | End: 2022-01-01 | Stop reason: HOSPADM

## 2022-01-01 RX ORDER — PROMETHAZINE HYDROCHLORIDE 6.25 MG/5ML
12.5 SYRUP ORAL EVERY 4 HOURS PRN
Status: DISCONTINUED | OUTPATIENT
Start: 2022-01-01 | End: 2022-01-01 | Stop reason: HOSPADM

## 2022-01-01 RX ORDER — LORAZEPAM 2 MG/ML
0.5 INJECTION INTRAMUSCULAR
Status: CANCELLED | OUTPATIENT
Start: 2022-01-01 | End: 2022-12-12

## 2022-01-01 RX ORDER — MORPHINE SULFATE 20 MG/ML
5 SOLUTION ORAL
Status: CANCELLED | OUTPATIENT
Start: 2022-01-01 | End: 2022-12-10

## 2022-01-01 RX ORDER — GLYCOPYRROLATE 0.2 MG/ML
0.2 INJECTION INTRAMUSCULAR; INTRAVENOUS
Status: DISCONTINUED | OUTPATIENT
Start: 2022-01-01 | End: 2022-01-01 | Stop reason: HOSPADM

## 2022-01-01 RX ORDER — LORAZEPAM 2 MG/ML
1 CONCENTRATE ORAL
Status: DISCONTINUED | OUTPATIENT
Start: 2022-01-01 | End: 2022-01-01 | Stop reason: HOSPADM

## 2022-01-01 RX ORDER — MORPHINE SULFATE 4 MG/ML
4 INJECTION, SOLUTION INTRAMUSCULAR; INTRAVENOUS
Status: DISCONTINUED | OUTPATIENT
Start: 2022-01-01 | End: 2022-01-01 | Stop reason: HOSPADM

## 2022-01-01 RX ORDER — LORAZEPAM 2 MG/ML
0.5 INJECTION INTRAMUSCULAR
Status: DISCONTINUED | OUTPATIENT
Start: 2022-01-01 | End: 2022-01-01 | Stop reason: HOSPADM

## 2022-01-01 RX ORDER — LORAZEPAM 2 MG/ML
2 CONCENTRATE ORAL
Status: DISCONTINUED | OUTPATIENT
Start: 2022-01-01 | End: 2022-01-01 | Stop reason: HOSPADM

## 2022-01-01 RX ORDER — PROMETHAZINE HYDROCHLORIDE 6.25 MG/5ML
12.5 SYRUP ORAL EVERY 4 HOURS PRN
Status: CANCELLED | OUTPATIENT
Start: 2022-01-01

## 2022-01-01 RX ORDER — DIPHENHYDRAMINE HCL 25 MG
25 CAPSULE ORAL EVERY 6 HOURS PRN
Status: CANCELLED | OUTPATIENT
Start: 2022-01-01

## 2022-01-01 RX ORDER — PROMETHAZINE HYDROCHLORIDE 12.5 MG/1
12.5 SUPPOSITORY RECTAL EVERY 4 HOURS PRN
Status: DISCONTINUED | OUTPATIENT
Start: 2022-01-01 | End: 2022-01-01 | Stop reason: HOSPADM

## 2022-01-01 RX ORDER — DIPHENHYDRAMINE HCL 25 MG
25 CAPSULE ORAL EVERY 6 HOURS PRN
Status: DISCONTINUED | OUTPATIENT
Start: 2022-01-01 | End: 2022-01-01 | Stop reason: HOSPADM

## 2022-01-01 RX ORDER — ZIPRASIDONE MESYLATE 20 MG/ML
10 INJECTION, POWDER, LYOPHILIZED, FOR SOLUTION INTRAMUSCULAR ONCE
Status: COMPLETED | OUTPATIENT
Start: 2022-01-01 | End: 2022-01-01

## 2022-01-01 RX ORDER — METOPROLOL SUCCINATE 25 MG/1
25 TABLET, EXTENDED RELEASE ORAL DAILY
Status: DISCONTINUED | OUTPATIENT
Start: 2022-01-01 | End: 2022-01-01

## 2022-01-01 RX ORDER — MORPHINE SULFATE 20 MG/ML
20 SOLUTION ORAL
Status: DISCONTINUED | OUTPATIENT
Start: 2022-01-01 | End: 2022-01-01 | Stop reason: HOSPADM

## 2022-01-01 RX ORDER — DIPHENHYDRAMINE HYDROCHLORIDE AND LIDOCAINE HYDROCHLORIDE AND ALUMINUM HYDROXIDE AND MAGNESIUM HYDRO
5 KIT EVERY 4 HOURS PRN
Status: CANCELLED | OUTPATIENT
Start: 2022-01-01

## 2022-01-01 RX ORDER — PROMETHAZINE HYDROCHLORIDE 25 MG/1
12.5 TABLET ORAL EVERY 4 HOURS PRN
Status: CANCELLED | OUTPATIENT
Start: 2022-01-01

## 2022-01-01 RX ORDER — LORAZEPAM 2 MG/ML
1 INJECTION INTRAMUSCULAR
Status: CANCELLED | OUTPATIENT
Start: 2022-01-01 | End: 2022-12-12

## 2022-01-01 RX ORDER — ACETAMINOPHEN 325 MG/1
650 TABLET ORAL EVERY 4 HOURS PRN
Status: DISCONTINUED | OUTPATIENT
Start: 2022-01-01 | End: 2022-01-01 | Stop reason: HOSPADM

## 2022-01-01 RX ORDER — MORPHINE SULFATE 20 MG/ML
10 SOLUTION ORAL
Status: CANCELLED | OUTPATIENT
Start: 2022-01-01 | End: 2022-12-12

## 2022-01-01 RX ORDER — LORAZEPAM 2 MG/ML
2 CONCENTRATE ORAL
Status: CANCELLED | OUTPATIENT
Start: 2022-01-01 | End: 2022-12-12

## 2022-01-01 RX ORDER — SCOLOPAMINE TRANSDERMAL SYSTEM 1 MG/1
1 PATCH, EXTENDED RELEASE TRANSDERMAL
Status: CANCELLED | OUTPATIENT
Start: 2022-01-01

## 2022-01-01 RX ORDER — DIPHENHYDRAMINE HYDROCHLORIDE 50 MG/ML
25 INJECTION INTRAMUSCULAR; INTRAVENOUS EVERY 6 HOURS PRN
Status: CANCELLED | OUTPATIENT
Start: 2022-01-01

## 2022-01-01 RX ORDER — ACETAMINOPHEN 325 MG/1
650 TABLET ORAL EVERY 4 HOURS PRN
Status: CANCELLED | OUTPATIENT
Start: 2022-01-01

## 2022-01-01 RX ORDER — MORPHINE SULFATE 2 MG/ML
2 INJECTION, SOLUTION INTRAMUSCULAR; INTRAVENOUS
Status: CANCELLED | OUTPATIENT
Start: 2022-01-01 | End: 2022-12-10

## 2022-01-01 RX ORDER — DIPHENOXYLATE HYDROCHLORIDE AND ATROPINE SULFATE 2.5; .025 MG/1; MG/1
1 TABLET ORAL
Status: DISCONTINUED | OUTPATIENT
Start: 2022-01-01 | End: 2022-01-01 | Stop reason: HOSPADM

## 2022-01-01 RX ORDER — PROMETHAZINE HYDROCHLORIDE 25 MG/1
12.5 TABLET ORAL EVERY 4 HOURS PRN
Status: DISCONTINUED | OUTPATIENT
Start: 2022-01-01 | End: 2022-01-01 | Stop reason: HOSPADM

## 2022-01-01 RX ORDER — MORPHINE SULFATE 10 MG/ML
6 INJECTION INTRAMUSCULAR; INTRAVENOUS; SUBCUTANEOUS
Status: DISCONTINUED | OUTPATIENT
Start: 2022-01-01 | End: 2022-01-01 | Stop reason: HOSPADM

## 2022-01-01 RX ORDER — LORAZEPAM 2 MG/ML
1 CONCENTRATE ORAL
Status: CANCELLED | OUTPATIENT
Start: 2022-01-01 | End: 2022-12-12

## 2022-01-01 RX ORDER — MORPHINE SULFATE 4 MG/ML
4 INJECTION, SOLUTION INTRAMUSCULAR; INTRAVENOUS
Status: CANCELLED | OUTPATIENT
Start: 2022-01-01 | End: 2022-12-12

## 2022-01-01 RX ORDER — SODIUM CHLORIDE 0.9 % (FLUSH) 0.9 %
10 SYRINGE (ML) INJECTION AS NEEDED
Status: DISCONTINUED | OUTPATIENT
Start: 2022-01-01 | End: 2022-01-01

## 2022-01-01 RX ORDER — GLYCOPYRROLATE 0.2 MG/ML
0.4 INJECTION INTRAMUSCULAR; INTRAVENOUS
Status: CANCELLED | OUTPATIENT
Start: 2022-01-01

## 2022-01-01 RX ORDER — DIPHENOXYLATE HYDROCHLORIDE AND ATROPINE SULFATE 2.5; .025 MG/1; MG/1
1 TABLET ORAL
Status: CANCELLED | OUTPATIENT
Start: 2022-01-01

## 2022-01-01 RX ORDER — MORPHINE SULFATE 2 MG/ML
2 INJECTION, SOLUTION INTRAMUSCULAR; INTRAVENOUS
Status: DISCONTINUED | OUTPATIENT
Start: 2022-01-01 | End: 2022-01-01 | Stop reason: HOSPADM

## 2022-01-01 RX ORDER — ACETAMINOPHEN 160 MG/5ML
650 SOLUTION ORAL EVERY 4 HOURS PRN
Status: DISCONTINUED | OUTPATIENT
Start: 2022-01-01 | End: 2022-01-01

## 2022-01-01 RX ORDER — LIDOCAINE HYDROCHLORIDE AND EPINEPHRINE 10; 10 MG/ML; UG/ML
10 INJECTION, SOLUTION INFILTRATION; PERINEURAL ONCE
Status: COMPLETED | OUTPATIENT
Start: 2022-01-01 | End: 2022-01-01

## 2022-01-01 RX ORDER — HYDROMORPHONE HYDROCHLORIDE 1 MG/ML
0.5 INJECTION, SOLUTION INTRAMUSCULAR; INTRAVENOUS; SUBCUTANEOUS
Status: CANCELLED | OUTPATIENT
Start: 2022-01-01 | End: 2022-12-10

## 2022-01-01 RX ORDER — GLYCOPYRROLATE 0.2 MG/ML
0.2 INJECTION INTRAMUSCULAR; INTRAVENOUS
Status: CANCELLED | OUTPATIENT
Start: 2022-01-01

## 2022-01-01 RX ORDER — MORPHINE SULFATE 20 MG/ML
20 SOLUTION ORAL
Status: CANCELLED | OUTPATIENT
Start: 2022-01-01 | End: 2022-12-12

## 2022-01-01 RX ORDER — HYDROMORPHONE HYDROCHLORIDE 1 MG/ML
0.5 INJECTION, SOLUTION INTRAMUSCULAR; INTRAVENOUS; SUBCUTANEOUS
Status: DISCONTINUED | OUTPATIENT
Start: 2022-01-01 | End: 2022-01-01 | Stop reason: HOSPADM

## 2022-01-01 RX ORDER — ALENDRONATE SODIUM 70 MG/1
70 TABLET ORAL
Qty: 4 TABLET | Refills: 11 | Status: SHIPPED | OUTPATIENT
Start: 2022-01-01

## 2022-01-01 RX ORDER — ACETAMINOPHEN 650 MG/1
650 SUPPOSITORY RECTAL EVERY 4 HOURS PRN
Status: DISCONTINUED | OUTPATIENT
Start: 2022-01-01 | End: 2022-01-01

## 2022-01-01 RX ORDER — LORAZEPAM 2 MG/ML
0.5 CONCENTRATE ORAL
Status: CANCELLED | OUTPATIENT
Start: 2022-01-01 | End: 2022-12-12

## 2022-01-01 RX ORDER — MORPHINE SULFATE 10 MG/ML
6 INJECTION INTRAMUSCULAR; INTRAVENOUS; SUBCUTANEOUS
Status: CANCELLED | OUTPATIENT
Start: 2022-01-01 | End: 2022-12-12

## 2022-01-01 RX ORDER — KETOROLAC TROMETHAMINE 15 MG/ML
15 INJECTION, SOLUTION INTRAMUSCULAR; INTRAVENOUS EVERY 6 HOURS PRN
Status: DISCONTINUED | OUTPATIENT
Start: 2022-01-01 | End: 2022-01-01 | Stop reason: HOSPADM

## 2022-01-01 RX ORDER — ACETAMINOPHEN 160 MG/5ML
650 SOLUTION ORAL EVERY 4 HOURS PRN
Status: DISCONTINUED | OUTPATIENT
Start: 2022-01-01 | End: 2022-01-01 | Stop reason: HOSPADM

## 2022-01-01 RX ORDER — ACETAMINOPHEN 325 MG/1
650 TABLET ORAL EVERY 4 HOURS PRN
Status: DISCONTINUED | OUTPATIENT
Start: 2022-01-01 | End: 2022-01-01

## 2022-01-01 RX ORDER — SCOLOPAMINE TRANSDERMAL SYSTEM 1 MG/1
1 PATCH, EXTENDED RELEASE TRANSDERMAL
Status: DISCONTINUED | OUTPATIENT
Start: 2022-01-01 | End: 2022-01-01 | Stop reason: HOSPADM

## 2022-01-01 RX ORDER — EZETIMIBE 10 MG/1
TABLET ORAL
Qty: 30 TABLET | Refills: 1 | Status: SHIPPED | OUTPATIENT
Start: 2022-01-01 | End: 2022-01-01

## 2022-01-01 RX ORDER — GLYCOPYRROLATE 0.2 MG/ML
0.4 INJECTION INTRAMUSCULAR; INTRAVENOUS
Status: DISCONTINUED | OUTPATIENT
Start: 2022-01-01 | End: 2022-01-01 | Stop reason: HOSPADM

## 2022-01-01 RX ORDER — LORAZEPAM 2 MG/ML
0.5 CONCENTRATE ORAL
Status: DISCONTINUED | OUTPATIENT
Start: 2022-01-01 | End: 2022-01-01 | Stop reason: HOSPADM

## 2022-01-01 RX ORDER — DIPHENHYDRAMINE HYDROCHLORIDE AND LIDOCAINE HYDROCHLORIDE AND ALUMINUM HYDROXIDE AND MAGNESIUM HYDRO
5 KIT EVERY 4 HOURS PRN
Status: DISCONTINUED | OUTPATIENT
Start: 2022-01-01 | End: 2022-01-01 | Stop reason: HOSPADM

## 2022-01-01 RX ORDER — MORPHINE SULFATE 20 MG/ML
10 SOLUTION ORAL
Status: DISCONTINUED | OUTPATIENT
Start: 2022-01-01 | End: 2022-01-01 | Stop reason: HOSPADM

## 2022-01-01 RX ORDER — EZETIMIBE 10 MG/1
10 TABLET ORAL DAILY
Qty: 30 TABLET | Refills: 1 | Status: SHIPPED | OUTPATIENT
Start: 2022-01-01 | End: 2022-01-01

## 2022-01-01 RX ORDER — EZETIMIBE 10 MG/1
TABLET ORAL
Qty: 90 TABLET | Refills: 1 | Status: SHIPPED | OUTPATIENT
Start: 2022-01-01

## 2022-01-01 RX ORDER — METOPROLOL SUCCINATE 25 MG/1
25 TABLET, EXTENDED RELEASE ORAL DAILY
Qty: 30 TABLET | Refills: 11 | Status: SHIPPED | OUTPATIENT
Start: 2022-01-01

## 2022-01-01 RX ORDER — KETOROLAC TROMETHAMINE 15 MG/ML
15 INJECTION, SOLUTION INTRAMUSCULAR; INTRAVENOUS EVERY 6 HOURS PRN
Status: CANCELLED | OUTPATIENT
Start: 2022-01-01 | End: 2022-12-10

## 2022-01-01 RX ORDER — DIPHENHYDRAMINE HYDROCHLORIDE 50 MG/ML
25 INJECTION INTRAMUSCULAR; INTRAVENOUS EVERY 6 HOURS PRN
Status: DISCONTINUED | OUTPATIENT
Start: 2022-01-01 | End: 2022-01-01 | Stop reason: HOSPADM

## 2022-01-01 RX ORDER — SODIUM CHLORIDE 0.9 % (FLUSH) 0.9 %
10 SYRINGE (ML) INJECTION AS NEEDED
Status: DISCONTINUED | OUTPATIENT
Start: 2022-01-01 | End: 2022-01-01 | Stop reason: HOSPADM

## 2022-01-01 RX ORDER — ZIPRASIDONE MESYLATE 20 MG/ML
10 INJECTION, POWDER, LYOPHILIZED, FOR SOLUTION INTRAMUSCULAR ONCE
Status: DISCONTINUED | OUTPATIENT
Start: 2022-01-01 | End: 2022-01-01

## 2022-01-01 RX ORDER — SODIUM CHLORIDE 9 MG/ML
75 INJECTION, SOLUTION INTRAVENOUS CONTINUOUS
Status: DISCONTINUED | OUTPATIENT
Start: 2022-01-01 | End: 2022-01-01

## 2022-01-01 RX ORDER — CEFAZOLIN SODIUM 2 G/100ML
2 INJECTION, SOLUTION INTRAVENOUS EVERY 8 HOURS
Status: DISCONTINUED | OUTPATIENT
Start: 2022-01-01 | End: 2022-01-01

## 2022-01-01 RX ORDER — HYDROMORPHONE HCL 110MG/55ML
1.5 PATIENT CONTROLLED ANALGESIA SYRINGE INTRAVENOUS
Status: CANCELLED | OUTPATIENT
Start: 2022-01-01 | End: 2022-12-12

## 2022-01-01 RX ORDER — CALCIUM CARBONATE 500(1250)
500 TABLET ORAL DAILY
Status: DISCONTINUED | OUTPATIENT
Start: 2022-01-01 | End: 2022-01-01

## 2022-01-01 RX ORDER — ZIPRASIDONE MESYLATE 20 MG/ML
INJECTION, POWDER, LYOPHILIZED, FOR SOLUTION INTRAMUSCULAR
Status: COMPLETED
Start: 2022-01-01 | End: 2022-01-01

## 2022-01-01 RX ORDER — ACETAMINOPHEN 500 MG
1000 TABLET ORAL ONCE
Status: DISCONTINUED | OUTPATIENT
Start: 2022-01-01 | End: 2022-01-01 | Stop reason: HOSPADM

## 2022-01-01 RX ADMIN — ACETAMINOPHEN 650 MG: 325 TABLET, FILM COATED ORAL at 14:35

## 2022-01-01 RX ADMIN — CEFTRIAXONE SODIUM 1 G: 1 INJECTION, POWDER, FOR SOLUTION INTRAMUSCULAR; INTRAVENOUS at 23:15

## 2022-01-01 RX ADMIN — CEFAZOLIN SODIUM 2 G: 2 INJECTION, SOLUTION INTRAVENOUS at 09:56

## 2022-01-01 RX ADMIN — LORAZEPAM 1 MG: 2 INJECTION INTRAMUSCULAR; INTRAVENOUS at 11:52

## 2022-01-01 RX ADMIN — Medication 500 MG: at 09:43

## 2022-01-01 RX ADMIN — MAGNESIUM OXIDE 400 MG (241.3 MG MAGNESIUM) TABLET 400 MG: TABLET at 09:02

## 2022-01-01 RX ADMIN — MORPHINE SULFATE 2 MG: 2 INJECTION, SOLUTION INTRAMUSCULAR; INTRAVENOUS at 17:21

## 2022-01-01 RX ADMIN — CEFAZOLIN SODIUM 2 G: 2 INJECTION, SOLUTION INTRAVENOUS at 02:48

## 2022-01-01 RX ADMIN — CEFAZOLIN SODIUM 2 G: 2 INJECTION, SOLUTION INTRAVENOUS at 01:20

## 2022-01-01 RX ADMIN — ACETAMINOPHEN 650 MG: 325 TABLET, FILM COATED ORAL at 09:57

## 2022-01-01 RX ADMIN — METOPROLOL SUCCINATE 25 MG: 25 TABLET, EXTENDED RELEASE ORAL at 08:34

## 2022-01-01 RX ADMIN — CEFAZOLIN SODIUM 2 G: 2 INJECTION, SOLUTION INTRAVENOUS at 18:05

## 2022-01-01 RX ADMIN — LORAZEPAM 1 MG: 2 INJECTION INTRAMUSCULAR; INTRAVENOUS at 17:21

## 2022-01-01 RX ADMIN — MAGNESIUM OXIDE 400 MG (241.3 MG MAGNESIUM) TABLET 400 MG: TABLET at 09:56

## 2022-01-01 RX ADMIN — CEFAZOLIN SODIUM 2 G: 2 INJECTION, SOLUTION INTRAVENOUS at 09:53

## 2022-01-01 RX ADMIN — LORAZEPAM 1 MG: 2 INJECTION INTRAMUSCULAR; INTRAVENOUS at 12:25

## 2022-01-01 RX ADMIN — ACETAMINOPHEN 650 MG: 325 TABLET, FILM COATED ORAL at 18:12

## 2022-01-01 RX ADMIN — Medication 500 MG: at 08:34

## 2022-01-01 RX ADMIN — LORAZEPAM 0.5 MG: 2 INJECTION INTRAMUSCULAR; INTRAVENOUS at 13:47

## 2022-01-01 RX ADMIN — CEFAZOLIN SODIUM 2 G: 2 INJECTION, SOLUTION INTRAVENOUS at 17:16

## 2022-01-01 RX ADMIN — LORAZEPAM 1 MG: 2 INJECTION INTRAMUSCULAR; INTRAVENOUS at 00:04

## 2022-01-01 RX ADMIN — Medication 500 MG: at 11:23

## 2022-01-01 RX ADMIN — HYDROMORPHONE HYDROCHLORIDE 1 MG: 1 INJECTION, SOLUTION INTRAMUSCULAR; INTRAVENOUS; SUBCUTANEOUS at 00:43

## 2022-01-01 RX ADMIN — Medication 500 MG: at 09:57

## 2022-01-01 RX ADMIN — LORAZEPAM 1 MG: 2 INJECTION INTRAMUSCULAR; INTRAVENOUS at 00:43

## 2022-01-01 RX ADMIN — CEFAZOLIN SODIUM 2 G: 2 INJECTION, SOLUTION INTRAVENOUS at 02:08

## 2022-01-01 RX ADMIN — Medication 500 MG: at 09:02

## 2022-01-01 RX ADMIN — ACETAMINOPHEN 650 MG: 325 TABLET, FILM COATED ORAL at 04:01

## 2022-01-01 RX ADMIN — SODIUM CHLORIDE 75 ML/HR: 9 INJECTION, SOLUTION INTRAVENOUS at 01:42

## 2022-01-01 RX ADMIN — METOPROLOL SUCCINATE 25 MG: 25 TABLET, EXTENDED RELEASE ORAL at 09:45

## 2022-01-01 RX ADMIN — METOPROLOL SUCCINATE 25 MG: 25 TABLET, EXTENDED RELEASE ORAL at 11:23

## 2022-01-01 RX ADMIN — CEFAZOLIN SODIUM 2 G: 2 INJECTION, SOLUTION INTRAVENOUS at 11:39

## 2022-01-01 RX ADMIN — MORPHINE SULFATE 4 MG: 4 INJECTION, SOLUTION INTRAMUSCULAR; INTRAVENOUS at 09:36

## 2022-01-01 RX ADMIN — Medication 500 MG: at 09:56

## 2022-01-01 RX ADMIN — MORPHINE SULFATE 4 MG: 4 INJECTION, SOLUTION INTRAMUSCULAR; INTRAVENOUS at 00:04

## 2022-01-01 RX ADMIN — VANCOMYCIN HYDROCHLORIDE 1250 MG: 10 INJECTION, POWDER, LYOPHILIZED, FOR SOLUTION INTRAVENOUS at 20:05

## 2022-01-01 RX ADMIN — Medication 3 ML: at 14:32

## 2022-01-01 RX ADMIN — Medication 500 MG: at 08:56

## 2022-01-01 RX ADMIN — HYDROMORPHONE HYDROCHLORIDE 1 MG: 1 INJECTION, SOLUTION INTRAMUSCULAR; INTRAVENOUS; SUBCUTANEOUS at 16:28

## 2022-01-01 RX ADMIN — CEFAZOLIN SODIUM 2 G: 2 INJECTION, SOLUTION INTRAVENOUS at 11:14

## 2022-01-01 RX ADMIN — MAGNESIUM OXIDE 400 MG (241.3 MG MAGNESIUM) TABLET 400 MG: TABLET at 09:57

## 2022-01-01 RX ADMIN — SODIUM CHLORIDE 75 ML/HR: 9 INJECTION, SOLUTION INTRAVENOUS at 20:22

## 2022-01-01 RX ADMIN — SODIUM CHLORIDE 1000 ML: 9 INJECTION, SOLUTION INTRAVENOUS at 15:56

## 2022-01-01 RX ADMIN — LORAZEPAM 1 MG: 2 INJECTION INTRAMUSCULAR; INTRAVENOUS at 16:28

## 2022-01-01 RX ADMIN — CEFAZOLIN SODIUM 2 G: 2 INJECTION, SOLUTION INTRAVENOUS at 20:39

## 2022-01-01 RX ADMIN — METOPROLOL SUCCINATE 25 MG: 25 TABLET, EXTENDED RELEASE ORAL at 08:57

## 2022-01-01 RX ADMIN — ACETAMINOPHEN 650 MG: 325 TABLET, FILM COATED ORAL at 15:40

## 2022-01-01 RX ADMIN — CEFAZOLIN SODIUM 2 G: 2 INJECTION, SOLUTION INTRAVENOUS at 18:12

## 2022-01-01 RX ADMIN — METOPROLOL SUCCINATE 25 MG: 25 TABLET, EXTENDED RELEASE ORAL at 14:33

## 2022-01-01 RX ADMIN — METOPROLOL SUCCINATE 25 MG: 25 TABLET, EXTENDED RELEASE ORAL at 09:02

## 2022-01-01 RX ADMIN — CEFAZOLIN SODIUM 2 G: 2 INJECTION, SOLUTION INTRAVENOUS at 01:27

## 2022-01-01 RX ADMIN — SODIUM CHLORIDE 75 ML/HR: 9 INJECTION, SOLUTION INTRAVENOUS at 15:27

## 2022-01-01 RX ADMIN — MORPHINE SULFATE 2 MG: 2 INJECTION, SOLUTION INTRAMUSCULAR; INTRAVENOUS at 11:52

## 2022-01-01 RX ADMIN — TAZOBACTAM SODIUM AND PIPERACILLIN SODIUM 3.38 G: 375; 3 INJECTION, SOLUTION INTRAVENOUS at 15:57

## 2022-01-01 RX ADMIN — PERFLUTREN 2 ML: 6.52 INJECTION, SUSPENSION INTRAVENOUS at 17:15

## 2022-01-01 RX ADMIN — CEFAZOLIN SODIUM 2 G: 2 INJECTION, SOLUTION INTRAVENOUS at 18:19

## 2022-01-01 RX ADMIN — CEFAZOLIN SODIUM 2 G: 2 INJECTION, SOLUTION INTRAVENOUS at 01:22

## 2022-01-01 RX ADMIN — HYDROMORPHONE HYDROCHLORIDE 1 MG: 1 INJECTION, SOLUTION INTRAMUSCULAR; INTRAVENOUS; SUBCUTANEOUS at 05:01

## 2022-01-01 RX ADMIN — ZIPRASIDONE MESYLATE 10 MG: 20 INJECTION, POWDER, LYOPHILIZED, FOR SOLUTION INTRAMUSCULAR at 03:16

## 2022-01-01 RX ADMIN — LORAZEPAM 1 MG: 2 INJECTION INTRAMUSCULAR; INTRAVENOUS at 09:36

## 2022-01-01 RX ADMIN — SODIUM CHLORIDE 75 ML/HR: 9 INJECTION, SOLUTION INTRAVENOUS at 01:20

## 2022-01-01 RX ADMIN — MORPHINE SULFATE 4 MG: 4 INJECTION, SOLUTION INTRAMUSCULAR; INTRAVENOUS at 12:25

## 2022-01-01 RX ADMIN — CEFAZOLIN SODIUM 2 G: 2 INJECTION, SOLUTION INTRAVENOUS at 12:45

## 2022-01-01 RX ADMIN — CEFAZOLIN SODIUM 2 G: 2 INJECTION, SOLUTION INTRAVENOUS at 18:30

## 2022-01-01 RX ADMIN — MAGNESIUM OXIDE 400 MG (241.3 MG MAGNESIUM) TABLET 400 MG: TABLET at 08:57

## 2022-01-01 RX ADMIN — MORPHINE SULFATE 4 MG: 4 INJECTION, SOLUTION INTRAMUSCULAR; INTRAVENOUS at 17:11

## 2022-01-01 RX ADMIN — CEFAZOLIN SODIUM 2 G: 2 INJECTION, SOLUTION INTRAVENOUS at 11:43

## 2022-01-01 RX ADMIN — LORAZEPAM 1 MG: 2 INJECTION INTRAMUSCULAR; INTRAVENOUS at 09:04

## 2022-01-01 RX ADMIN — LORAZEPAM 1 MG: 2 INJECTION INTRAMUSCULAR; INTRAVENOUS at 05:01

## 2022-01-01 RX ADMIN — ACETAMINOPHEN 650 MG: 325 TABLET, FILM COATED ORAL at 09:02

## 2022-01-01 RX ADMIN — CEFAZOLIN SODIUM 2 G: 2 INJECTION, SOLUTION INTRAVENOUS at 12:17

## 2022-01-01 RX ADMIN — METOPROLOL SUCCINATE 25 MG: 25 TABLET, EXTENDED RELEASE ORAL at 09:56

## 2022-01-01 RX ADMIN — HYDROMORPHONE HYDROCHLORIDE 1 MG: 1 INJECTION, SOLUTION INTRAMUSCULAR; INTRAVENOUS; SUBCUTANEOUS at 09:04

## 2022-01-01 RX ADMIN — SODIUM CHLORIDE 75 ML/HR: 9 INJECTION, SOLUTION INTRAVENOUS at 09:56

## 2022-01-01 RX ADMIN — MORPHINE SULFATE 2 MG: 2 INJECTION, SOLUTION INTRAMUSCULAR; INTRAVENOUS at 13:47

## 2022-01-01 RX ADMIN — SODIUM CHLORIDE 75 ML/HR: 9 INJECTION, SOLUTION INTRAVENOUS at 05:53

## 2022-01-01 RX ADMIN — APIXABAN 2.5 MG: 2.5 TABLET, FILM COATED ORAL at 09:03

## 2022-01-01 RX ADMIN — MAGNESIUM OXIDE 400 MG (241.3 MG MAGNESIUM) TABLET 400 MG: TABLET at 08:34

## 2022-01-01 RX ADMIN — Medication 500 MG: at 09:45

## 2022-01-01 RX ADMIN — CEFAZOLIN SODIUM 2 G: 2 INJECTION, SOLUTION INTRAVENOUS at 09:54

## 2022-01-01 RX ADMIN — IOPAMIDOL 85 ML: 612 INJECTION, SOLUTION INTRAVENOUS at 04:24

## 2022-01-01 RX ADMIN — METOPROLOL SUCCINATE 25 MG: 25 TABLET, EXTENDED RELEASE ORAL at 09:57

## 2022-01-01 RX ADMIN — MAGNESIUM OXIDE 400 MG (241.3 MG MAGNESIUM) TABLET 400 MG: TABLET at 09:45

## 2022-01-01 RX ADMIN — LIDOCAINE HYDROCHLORIDE,EPINEPHRINE BITARTRATE 10 ML: 10; .01 INJECTION, SOLUTION INFILTRATION; PERINEURAL at 10:19

## 2022-01-01 RX ADMIN — HYDROMORPHONE HYDROCHLORIDE 1 MG: 1 INJECTION, SOLUTION INTRAMUSCULAR; INTRAVENOUS; SUBCUTANEOUS at 21:01

## 2022-01-01 RX ADMIN — LORAZEPAM 1 MG: 2 INJECTION INTRAMUSCULAR; INTRAVENOUS at 21:01

## 2022-01-01 RX ADMIN — MORPHINE SULFATE 2 MG: 2 INJECTION, SOLUTION INTRAMUSCULAR; INTRAVENOUS at 04:23

## 2022-01-01 RX ADMIN — APIXABAN 2.5 MG: 2.5 TABLET, FILM COATED ORAL at 23:19

## 2022-01-01 RX ADMIN — MAGNESIUM OXIDE 400 MG (241.3 MG MAGNESIUM) TABLET 400 MG: TABLET at 09:43

## 2022-01-01 RX ADMIN — CEFAZOLIN SODIUM 2 G: 2 INJECTION, SOLUTION INTRAVENOUS at 04:45

## 2022-02-28 NOTE — PROGRESS NOTES
"    I N T E R N A L  M E D I C I N E  ZACH AMEZCUA, APRN      ENCOUNTER DATE:  02/28/2022    Anatoliy Tinsley / 86 y.o. / female      CHIEF COMPLAINT / REASON FOR OFFICE VISIT     Hyperlipidemia (est care)      ASSESSMENT & PLAN     1. Mixed hyperlipidemia  -Restart Zetia  - Comprehensive Metabolic Panel  - TSH+Free T4  - Urinalysis With Culture If Indicated - Urine, Clean Catch    2. Paroxysmal atrial fibrillation (HCC)  -Restart Eliquis  -   - CBC & Differential  - TSH+Free T4    3. Carotid stenosis, asymptomatic, left  -Advised to restart anticoagulant along with Zetia  - Lipid Panel With / Chol / HDL Ratio    4. Aphasia  -Refused by patient for order of MRI of the brain  -Refused establish with neurology, referral placed by previous PCP  -Educated on risk of not continuing work-up    5. Hoarse  -Provided phone number to reschedule with otolaryngologist    Orders Placed This Encounter   Procedures   • Comprehensive Metabolic Panel   • Lipid Panel With / Chol / HDL Ratio   • TSH+Free T4   • Urinalysis With Culture If Indicated - Urine, Clean Catch   • CBC & Differential     No orders of the defined types were placed in this encounter.      SUMMARY/DISCUSSION  • Follow-up in 3 months for Medicare wellness and chronic medical, earlier if needed    Next Appointment with me: Visit date not found    Return in about 3 months (around 5/28/2022) for Medicare Wellness.      VITAL SIGNS     Visit Vitals  /70 (Cuff Size: Adult)   Pulse 99   Temp 98 °F (36.7 °C) (Temporal)   Ht 157.5 cm (62\")   Wt 57.2 kg (126 lb 3.2 oz)   SpO2 97%   BMI 23.08 kg/m²       Wt Readings from Last 3 Encounters:   02/28/22 57.2 kg (126 lb 3.2 oz)   12/27/21 61.2 kg (135 lb)   03/04/21 59.9 kg (132 lb)     Body mass index is 23.08 kg/m².      MEDICATIONS AT THE TIME OF OFFICE VISIT     Current Outpatient Medications on File Prior to Visit   Medication Sig   • alendronate (Fosamax) 70 MG tablet Take 1 tablet by mouth Every 7 (Seven) Days.   • " calcium carbonate-vitamin d (Calcium 600+D) 600-400 MG-UNIT per tablet Take 1 tablet by mouth Daily.   • Eliquis 2.5 MG tablet tablet TAKE ONE TABLET BY MOUTH EVERY 12 HOURS   • ezetimibe (ZETIA) 10 MG tablet TAKE ONE TABLET BY MOUTH DAILY   • ASPIRIN 81 PO Take  by mouth.   • MAGNESIUM PO Take  by mouth.     No current facility-administered medications on file prior to visit.         HISTORY OF PRESENT ILLNESS     Patient presents to establish care with new provider in office.  She lives with her brother who presents with her today and states that he will help bring her to her appointments and  to make appointments.    Followed by Dr. Lainez cardiology for asymptomatic left carotid stenosis, atrial fibrillation and hyperlipidemia.  Last seen by their office in December 2020.  On Eliquis for A. Fib due to memory and subsequent relation issues.  Carotid stenosis status post left carotid enterectomy and possible history of stroke.  Occasional skipped beats or heart fluttering that only last for seconds.  Some intermittent dizziness with no raul syncopal or syncopal episodes.  Most recent Holter monitor and echocardiogram were unremarkable.  Continuing statin with history of carotid stenosis and hyperlipidemia.  Recommend follow-up every 6 months.    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  Debrided the lesions and discussed benefit of regular daily foot baths and use foam only to reduce skin dryness.  Denies any issues or pain today.    Last seen by PCP in March 2021 complaint of aphasia associated with lightheadedness.  Believes it lasted few minutes at a time.  Thinks she may have been diagnosed with ischemia or TIA many years ago prior to level.  Multiple episodes like this in the past.  Refusal of MRI of the brain and refusal to be seen by neurology at this time.  Has stopped all her medication for 3 months including her Eliquis  and Zetia.    Myalgias for several years pain located in bilateral lower legs.  Aching and intermittent.  With no claudication no muscle cramps.  Resolved at this time according to patient.    Hoarseness of voice after talking for extended periods.  No dysphagia or swallowing difficulties.  She is acceptable to see the otolaryngologist we provided information to her.    Osteoporosis, stop Fosamax for 3 months.  Recommend restarting along with vitamin D supplementation.    Last emergency room visit on December 2021 in which she rolled out of bed and hit her lower back and left knee.  CT of the head showed no acute intracranial findings with inflammatory sinus changes.  No acute fracture of the knee.  No acute fracture of the cervical spine she did have significant degenerative disc disease along with foraminal narrowing.    47 minutes were spent in reviewing patient history, assessment and plan along with discussion of risks of not continuing current prescribed medications or further work-up with current symptoms    REVIEW OF SYSTEMS     Constitutional neg except per HPI   ENT hoarse voice   Resp neg  CV neg  Neuro memory loss     PHYSICAL EXAMINATION     Physical Exam  Constitutional  No distress  Cardiovascular Rate  normal . Rhythm: irregular . Heart sounds:  normal  Pulmonary/Chest  Effort normal. Breath sounds:  normal  Psychiatric  Alert.     REVIEWED DATA     Labs:   Lab Results   Component Value Date    GLUCOSE 74 03/04/2021    BUN 18 03/04/2021    CREATININE 0.88 03/04/2021    EGFRIFNONA 61 03/04/2021    EGFRIFAFRI 74 03/04/2021    BCR 20.5 03/04/2021    K 4.4 03/04/2021    CO2 31.1 (H) 03/04/2021    CALCIUM 10.3 03/04/2021    PROTENTOTREF 6.5 03/04/2021    ALBUMIN 4.40 03/04/2021    LABIL2 2.1 03/04/2021    AST 21 03/04/2021    ALT 7 03/04/2021     Lab Results   Component Value Date    CHLPL 216 (H) 03/04/2021    TRIG 75 03/04/2021    HDL 75 (H) 03/04/2021     (H) 03/04/2021     Lab Results    Component Value Date    WBC 6.31 03/04/2021    HGB 14.7 03/04/2021    HCT 45.3 03/04/2021    MCV 95.4 03/04/2021     03/04/2021     Lab Results   Component Value Date    TSH 1.090 03/04/2021     Lab Results   Component Value Date    HGBA1C 5.30 10/05/2020         Imaging:           Medical Tests:             Summary of old records / correspondence / consultant report:           Request outside records:           *Examiner was wearing medical surgical mask, face shield and exam gloves during the entire duration of the visit. Patient was masked the entire time.   Minimum social distance of 6 ft maintained entire visit except if physical contact was necessary as documented.     Dictated utilizing Dragon dictation

## 2022-03-02 NOTE — PROGRESS NOTES
A My-Chart message has been sent to the patient for PATIENT ROUNDING with [facility]

## 2022-03-11 NOTE — DISCHARGE INSTRUCTIONS
You have been seen today in the ER for head injury.  Your imaging in the emergency department was negative for any acute intracranial pathology.  Because you are on a anticoagulant, there is a small risk of a delayed intracranial bleed for up to 1 week from the time of injury.  Please have family members keep a close eye on you over the next several days.  You may wash her hair when you get back to the facility, your staples need to come out in 7 days.  Please make sure you have close follow-up with your primary care physician.  Please return to the emergency department immediately for headache, visual disturbance, confusion, weakness, nausea or vomiting or any other concerning symptoms

## 2022-03-11 NOTE — ED PROVIDER NOTES
MD ATTESTATION NOTE    The LEAH and I have discussed this patient's history, physical exam, and treatment plan.  I have reviewed the documentation and personally had a face to face interaction with the patient. I affirm the documentation and agree with the treatment and plan.  The attached note describes my personal findings.      I provided a substantive portion of the care of the patient.  I personally performed the physical exam in its entirety, and below are my findings.  For this patient encounter, the patient wore surgical mask, I wore full protective PPE including N95 and eye protection.      Brief HPI: Patient here for fall and head injury.  Patient is very hard of hearing and has dementia.  Patient had unwitnessed fall at nursing home.  Patient is on Eliquis.  Patient hit her head and think she has some neck pain.    PHYSICAL EXAM  ED Triage Vitals   Temp Heart Rate Resp BP SpO2   03/11/22 0753 03/11/22 0751 03/11/22 0751 03/11/22 0751 03/11/22 0751   97.6 °F (36.4 °C) 86 18 (!) 186/94 95 %      Temp src Heart Rate Source Patient Position BP Location FiO2 (%)   03/11/22 0753 -- -- -- --   Tympanic             GENERAL: no acute distress  HENT: nares patent.  Patient does have hematoma on the top of her head.  Mild neck pain  EYES: no scleral icterus  CV: regular rhythm, normal rate  RESPIRATORY: normal effort  ABDOMEN: soft  MUSCULOSKELETAL: no deformity  NEURO: alert, moves all extremities, follows commands  PSYCH:  calm, cooperative  SKIN: warm, dry.  Laceration scalp    Vital signs and nursing notes reviewed.        Plan: CT head and CT C-spine       Gaurav Weathers MD  03/11/22 0881       Gaurav Weathers MD  03/11/22 5218

## 2022-03-11 NOTE — ED PROVIDER NOTES
EMERGENCY DEPARTMENT ENCOUNTER    Room Number:  03/03  Date of encounter:  3/11/2022  PCP: Gildardo Crum APRN  Historian: Patient      PPE    Patient was placed in face mask in first look. Patient was wearing facemask when I entered the room and throughout our encounter. I wore full protective equipment throughout this patient encounter including a face mask, and gloves. Hand hygiene was performed before donning protective equipment and after removal when leaving the room.        HPI:  Chief Complaint: Fall  A complete HPI/ROS/PMH/PSH/SH/FH are unobtainable due to: Patient very hard of hearing    Context: Anatoliy Tinsley is a 86 y.o. female who arrives to the ED via EMS from the [facility].  Patient presents with c/o head injury status post a fall prior to arrival.   Patient also complains of neck pain.  Patient states that she thinks she lost her balance causing her to fall.  Patient denies LOC, back pain, nausea, vomiting, extremity injury, chest pain or shortness of breath prior to the fall.  Patient states that nothing makes the symptoms better and nothing worsens symptoms.  Patient is on Eliquis for history of A. fib.      PAST MEDICAL HISTORY  Active Ambulatory Problems     Diagnosis Date Noted   • Paroxysmal atrial fibrillation (HCC) 10/15/2020   • Carotid stenosis, asymptomatic, left 10/15/2020   • Mixed hyperlipidemia 10/15/2020   • Osteoporosis      Resolved Ambulatory Problems     Diagnosis Date Noted   • No Resolved Ambulatory Problems     Past Medical History:   Diagnosis Date   • Atrial fibrillation (HCC)    • Atrial fibrillation (HCC)          PAST SURGICAL HISTORY  Past Surgical History:   Procedure Laterality Date   • CAROTID ENDARTERECTOMY     • HIP SURGERY     • TONSILLECTOMY           FAMILY HISTORY  Family History   Problem Relation Age of Onset   • Heart disease Mother    • Other Mother         low blood sugar   • Heart attack Mother    • Diabetes Father    • Heart attack Father    • Diabetes  Brother    • Heart disease Brother    • Heart disease Brother    • Cancer Brother    • Breast cancer Neg Hx    • Colon cancer Neg Hx          SOCIAL HISTORY  Social History     Socioeconomic History   • Marital status: Single   Tobacco Use   • Smoking status: Never Smoker   • Smokeless tobacco: Never Used   Vaping Use   • Vaping Use: Never used   Substance and Sexual Activity   • Alcohol use: Yes     Comment: may 2 times a year/caffeine use    • Drug use: Defer   • Sexual activity: Defer         ALLERGIES  Lipitor [atorvastatin calcium], Sugar-protein-starch, Atorvastatin, Brimonidine, Corn syrup, Diltiazem, Lactase, Soy allergy, and Wheat bran        REVIEW OF SYSTEMS  Review of Systems     All systems reviewed and negative except for those discussed in HPI.        PHYSICAL EXAM    ED Triage Vitals   Temp Heart Rate Resp BP SpO2   03/11/22 0753 03/11/22 0751 03/11/22 0751 03/11/22 0751 03/11/22 0751   97.6 °F (36.4 °C) 86 18 (!) 186/94 95 %       Physical Exam  HENT:      Head:         GENERAL: Elderly appearing, nontoxic appearing, not distressed  HENT: normocephalic, hematoma to the top of the head  EYES: no scleral icterus, PERRL.EOMI  CV: regular rhythm, regular rate, no murmur  RESPIRATORY: normal effort, CTAB  ABDOMEN: soft   MUSCULOSKELETAL: no deformity  Cervical vertebral tenderness to palpation, no thoracic or lumbar  No step off or crepitus noted  Cervical paraspinal tenderness to palpation  Bilateral equal handgrips  Pelvis is stable  NEURO: alert, moves all extremities, follows commands, mental status normal/baseline  SKIN: warm, dry, no rash   Psych: Appropriate mood and affect  Nursing notes and vital signs reviewed      LAB RESULTS  No results found for this or any previous visit (from the past 24 hour(s)).    Ordered the above labs and independently reviewed the results.      RADIOLOGY  CT Head Without Contrast    Result Date: 3/11/2022  EXAM:  CT HEAD WO CONTRAST-  HISTORY:  Unwitnessed fall, hit  head, hematoma on top of head, on anticoagulation.  COMPARISON:  CT head and cervical spine without contrast 12/27/2021  TECHNIQUE: Noncontrast images of the brain and cervical spine were obtained. Reformatted images were reviewed. Radiation dose reduction techniques were utilized, including automated exposure control and exposure modulation based on body size.  FINDINGS:   BRAIN:  There is moderate global parenchymal volume loss.  No acute intracranial hemorrhage or pathologic extra-axial collection is identified.  There is no midline shift or mass effect.  The basal cisterns are patent. Patchy areas of subcortical and periventricular hypoattenuation are most compatible with at least moderate chronic small vessel ischemic disease, not significantly changed from 12/27/2021. The grey-white matter differentiation is maintained. There is calcific intrarenal atherosclerosis with involvement of the posterior circulation. There is a small posterior subgaleal hematoma at midline. The calvarium is intact. Previously noted paranasal sinus opacification has largely resolved. Mastoid air cells are clear.  CERVICAL SPINE:  There is diffuse osseous demineralization, which limits evaluation of fine osseous detail. No definite acute cervical spine fracture is identified. There is straightening of the normal cervical lordosis. No other abnormalities of alignment are identified. There is mild height loss of the C4, C5, and C6 vertebral bodies, which is unchanged and likely degenerative. There is advanced disc height loss at C4-5 and C6-7 with lesser degrees of disc height loss at multiple additional levels. There is multilevel degenerative endplate osteophyte formation, Schmorl's node formation, uncovertebral hypertrophy, and facet osteoarthropathy. There are varying degrees of spinal canal stenosis, most pronounced at C4-5, where it is at least moderate to severe. There are lesser degrees of spinal canal stenosis at multiple  additional levels. There is multilevel neural foraminal stenosis, most pronounced on the right at C3-4 and bilaterally at C4-5, where it is moderate. There is bilateral calcific carotid atherosclerosis. There is an approximately 0.8 cm hypodense posterior right thyroid nodule.       1.  New small posterior subgaleal hematoma. No acute intracranial hemorrhage. 2.  Diffuse osseous demineralization with no definite acute cervical spine fracture. 3.  Multilevel degenerative disc disease, most pronounced at C4-5, as above.  Discussed with MAYRA Martinez at 9:27 AM.  This report was finalized on 3/11/2022 9:27 AM by Dr. Leda Easley M.D.        I ordered the above noted radiological studies and viewed the images on the PACS system.       MEDICAL RECORD REVIEW  Medical records reviewed in epic, patient had a unremarkable CT of the head in December 2021 after a fall at that time.      PROCEDURES    Laceration Repair    Date/Time: 3/11/2022 10:17 AM  Performed by: Amee Gonzales APRN  Authorized by: Gaurav Weathers MD     Consent:     Consent obtained:  Verbal    Consent given by:  Patient    Risks, benefits, and alternatives were discussed: yes      Risks discussed:  Infection and pain    Alternatives discussed:  No treatment  Universal protocol:     Patient identity confirmed:  Verbally with patient  Anesthesia:     Anesthesia method:  Local infiltration    Local anesthetic:  Lidocaine 1% WITH epi  Laceration details:     Location:  Scalp    Scalp location:  Mid-scalp    Length (cm):  6  Exploration:     Hemostasis achieved with:  Epinephrine    Contaminated: no    Treatment:     Area cleansed with:  Saline    Amount of cleaning:  Standard    Irrigation solution:  Sterile saline    Irrigation method:  Syringe  Skin repair:     Repair method:  Staples    Number of staples:  9  Approximation:     Approximation:  Close  Repair type:     Repair type:  Simple  Post-procedure details:     Dressing:   Open (no dressing)    Procedure completion:  Tolerated well, no immediate complications            DIFFERENTIAL DIAGNOSIS  Differential Diagnosis for head injury include but are not limited to the following:  Cerebral contusion, Concussion ( with or without LOC), Head contusion, Skull fracture, orbital fracture, Hematoma, Intracranial Hemorrhage, Laceration, Post Concussion Syndrome.         PROGRESS, DATA ANALYSIS, CONSULTS, AND MEDICAL DECISION MAKING        ED Course as of 03/11/22 1110   Fri Mar 11, 2022   0829 Patient is an 86-year-old female from the [facility] who presents today for head injury after a fall.  She is on Eliquis and does have a hematoma to the top of her head, will obtain CT head to rule out intercranial abnormality.  She is also complaining of neck pain, will do CT C-spine to rule out fracture or other bony abnormality.  Patient verbalized understanding and is agreeable to this plan. [MS]   0829 Reviewed pt's history and workup with Dr. Weathers.  After a bedside evaluation, they agree with the plan of care.       [MS]   0929  Discussed with Dr. Easley regarding the CT head and C-spine results which revealed small posterior scalp hematoma, no intracranial hemorrhage, no cervical spine fracture  See dictation for official radiology interpretation.     [MS]   1000   Imaging:  CT head: No acute abnormalities  CT C-spine: No acute abnormalities    Given history, exam and work-up low suspicious for intracranial hemorrhage, skull fracture, spine fracture or other acute spinal injuries, or extremity fracture.  Wound care instructions and staple removal discussed with patient and was given to facility.  Updated patient on imaging results.  Patient will be discharged home with strict return to ER precautions and instructions to follow-up with PMD as needed.     [MS]      ED Course User Index  [MS] Amee Gonzales, MAYRA     Discussed plan for discharge, as there is no emergent indication for  admission. Pt/family is agreeable and understands need for follow up and repeat testing.  Pt is aware that discharge does not mean that nothing is wrong but it indicates no emergency is present that requires admission and they must continue care with follow-up as given below or physician of their choice.   Patient/Family voiced understanding of above instructions.  Patient discharged in stable condition.    DIAGNOSIS  Final diagnoses:   Minor head injury, initial encounter   Strain of neck muscle, initial encounter   Laceration of scalp, initial encounter       FOLLOW UP   Gildardo Crum, MAYRA  [address] [place] [address]  [phone]      For suture removal      RX     Medication List      No changes were made to your prescriptions during this visit.           MEDICATIONS GIVEN IN ED    Medications   lidocaine 1% - EPINEPHrine 1:[id number] (XYLOCAINE W/EPI) 1 %-1:404904 injection 10 mL (10 mL Injection Given by Other 3/11/22 1019)           COURSE & MEDICAL DECISION MAKING  Any/All labs and Any/All Imaging studies that were ordered were reviewed and are noted above.  Results were reviewed/discussed with the patient and they were also made aware of online access.    Pt also made aware that some labs, such as cultures, will not be resulted during ER visit and followup with PMD is necessary.        Amee Gonzales, APRN  03/11/22 1111

## 2022-03-11 NOTE — ED TRIAGE NOTES
Patient to ed from EMS from [facility]. Patient was found this morning in another persons room on the floor. Patient had an unwitnessed fall. Patient is on blood thinners and per friend at facility patient did not have LOC. Patient has hematoma to back of head per ems. Wrapped by ems at this time. Patient is hard of hearing.     I wore full protective equipment throughout this patient encounter including a face mask, goggles, and gloves. Hand hygiene was performed before donning protective equipment and after removal when leaving the room.

## 2022-03-14 NOTE — OUTREACH NOTE
AMBULATORY CASE MANAGEMENT NOTE    Name and Relationship of Patient/Support Person: DEREK BORDEN - Emergency Contact    Patient Outreach    Introduced self, explained ACM RN role and provided contact information. Patient reported to be doing well. Follow up appointment scheduled for staple removal. No other needs at this time. Further outreach not requested.     Education Documentation  No documentation found.        KANDY RIVERA  Ambulatory Case Management    3/14/2022, 16:58 EDT

## 2022-03-18 NOTE — PROGRESS NOTES
"    I N T E R N A L  M E D I C I HERMILO E  MAYRA MEDLEY      ENCOUNTER DATE:  03/18/2022    Anatoliy Tinsley / 86 y.o. / female      CHIEF COMPLAINT / REASON FOR OFFICE VISIT     Suture / Staple Removal (S/P fall 3/11/2022) and Head Injury      ASSESSMENT & PLAN     1. Fall, subsequent encounter    2. Injury of head, subsequent encounter  Suture Removal    Date/Time: 3/18/2022 12:12 PM  Performed by: Gildardo Crum APRN  Authorized by: Gildardo Crum APRN   Consent: Verbal consent obtained.  Consent given by: patient  Body area: head/neck  Location details: scalp  Wound Appearance: clean and tender  Staples Removed: 9  Post-removal: antibiotic ointment applied  Patient tolerance: patient tolerated the procedure well with no immediate complications        Orders Placed This Encounter   Procedures   • Suture Removal     No orders of the defined types were placed in this encounter.      SUMMARY/DISCUSSION  • Follow-up as scheduled in June, earlier if needed    Next Appointment with me: 6/9/2022    Return for Next scheduled follow up.      VITAL SIGNS     Visit Vitals  /80 (Cuff Size: Adult)   Pulse 88   Temp 97.5 °F (36.4 °C) (Temporal)   Ht 162.6 cm (64\")   Wt 57.7 kg (127 lb 3.2 oz)   SpO2 99%   BMI 21.83 kg/m²       @BP@  Wt Readings from Last 3 Encounters:   03/18/22 57.7 kg (127 lb 3.2 oz)   03/11/22 68 kg (150 lb)   02/28/22 57.2 kg (126 lb 3.2 oz)     Body mass index is 21.83 kg/m².      MEDICATIONS AT THE TIME OF OFFICE VISIT     Current Outpatient Medications on File Prior to Visit   Medication Sig   • alendronate (Fosamax) 70 MG tablet Take 1 tablet by mouth Every 7 (Seven) Days.   • Eliquis 2.5 MG tablet tablet TAKE ONE TABLET BY MOUTH EVERY 12 HOURS   • ezetimibe (ZETIA) 10 MG tablet TAKE ONE TABLET BY MOUTH DAILY   • MAGNESIUM PO Take  by mouth.   • calcium carbonate-vitamin d (Calcium 600+D) 600-400 MG-UNIT per tablet Take 1 tablet by mouth Daily.   • [DISCONTINUED] ASPIRIN 81 PO Take  by mouth.     No " current facility-administered medications on file prior to visit.         HISTORY OF PRESENT ILLNESS     Follow-up from emergency room visit on March 11 due to head injury status post fall along with cervical pain.  No fracture on CT of the cervical spine, hematoma seen on CT of the head without any other acute injury.  9 staples placed.  Today she complains of no headache, resolved cervical pain, no dizziness or lightheadedness    REVIEW OF SYSTEMS     Constitutional neg except per HPI   ENT neg   Neuro memory los baseline, neg headache/dizziness     PHYSICAL EXAMINATION     Physical Exam  Constitutional  No distress  Cardiovascular Rate  normal . Rhythm: irregular . Heart sounds:  normal  Pulmonary/Chest  Effort normal. Breath sounds:  normal  Skin staples with dried discharge no signs of infection or extensive swelling of the scalp   Psychiatric  Alert.    REVIEWED DATA     Labs:         Imaging:   HISTORY:  Unwitnessed fall, hit head, hematoma on top of head, on  anticoagulation.     COMPARISON:  CT head and cervical spine without contrast 12/27/2021     TECHNIQUE: Noncontrast images of the brain and cervical spine were  obtained. Reformatted images were reviewed. Radiation dose reduction  techniques were utilized, including automated exposure control and  exposure modulation based on body size.     FINDINGS:       BRAIN:     There is moderate global parenchymal volume loss.  No acute intracranial  hemorrhage or pathologic extra-axial collection is identified.  There is  no midline shift or mass effect.  The basal cisterns are patent. Patchy  areas of subcortical and periventricular hypoattenuation are most  compatible with at least moderate chronic small vessel ischemic disease,  not significantly changed from 12/27/2021. The grey-white matter  differentiation is maintained. There is calcific intrarenal  atherosclerosis with involvement of the posterior circulation.   There is a small posterior subgaleal  hematoma at midline. The calvarium  is intact. Previously noted paranasal sinus opacification has largely  resolved. Mastoid air cells are clear.     CERVICAL SPINE:     There is diffuse osseous demineralization, which limits evaluation of  fine osseous detail. No definite acute cervical spine fracture is  identified. There is straightening of the normal cervical lordosis. No  other abnormalities of alignment are identified. There is mild height  loss of the C4, C5, and C6 vertebral bodies, which is unchanged and  likely degenerative. There is advanced disc height loss at C4-5 and C6-7  with lesser degrees of disc height loss at multiple additional levels.  There is multilevel degenerative endplate osteophyte formation,  Schmorl's node formation, uncovertebral hypertrophy, and facet  osteoarthropathy. There are varying degrees of spinal canal stenosis,  most pronounced at C4-5, where it is at least moderate to severe. There  are lesser degrees of spinal canal stenosis at multiple additional  levels. There is multilevel neural foraminal stenosis, most pronounced  on the right at C3-4 and bilaterally at C4-5, where it is moderate.  There is bilateral calcific carotid atherosclerosis. There is an  approximately 0.8 cm hypodense posterior right thyroid nodule.        IMPRESSION:  1.  New small posterior subgaleal hematoma. No acute intracranial  hemorrhage.  2.  Diffuse osseous demineralization with no definite acute cervical  spine fracture.  3.  Multilevel degenerative disc disease, most pronounced at C4-5, as  above.     Discussed with MAYRA Martinez at 9:27 AM.     This report was finalized on 3/11/2022 9:27 AM by Dr. Leda Easley M.D.        Medical Tests:             Summary of old records / correspondence / consultant report:           Request outside records:           *Examiner was wearing medical surgical mask, face shield and exam gloves during the entire duration of the visit. Patient was masked  the entire time.   Minimum social distance of 6 ft maintained entire visit except if physical contact was necessary as documented.     Dictated utilizing Dragon dictation

## 2022-04-11 NOTE — PROGRESS NOTES
[facility] Follow Up Office Note     Encounter Date:22  Patient:Anatoliy Tinsley  :1935  MRN:[id number]      Chief Complaint:   Chief Complaint   Patient presents with   • Atrial Fibrillation         History of Presenting Illness:      Anatoliy Tinsley is a 86 y.o. female here today for follow-up of PAF.  She is a patient of Dr. Lainez.    Patient has a past medical history significant for paroxysmal atrial fibrillation on Eliquis, carotic stenosis status post left carotid endarterectomy, mixed hyperlipidemia, questionable history of prior stroke, dementia, hard of hearing.    She was last seen in our office in 2020 by Dr. Lainez.  At this time she was having occasional skipped beats or heart fluttering that lasted for a few seconds.  She did report some dizziness but no presyncopal or syncopal episodes.  In 2020 patient did have Holter monitor demonstrating sinus rhythm with a heart ranged from 58 to 107 bpm,as well as an echocardiogram demonstrating low normal LVEF as well as some moderate valve calcification but no significant valvular regurgitation or stenosis.    Patient was seen in the ED at [facility] on 3/11/2022 after a fall and possible head injury.  She had an unwitnessed fall at her nursing home.  CT of head and C-spine were negative and she was subsequently discharged.    Patient is accompanied today by her brother.  She reports very occasional episodes of palpitations.  She states these are associated with episodes of anxiety.  She may also occasionally have shortness of breath also related to anxiety.  She denies dyspnea on exertion, lower extremity edema.  She is on 2.5 twice daily Eliquis dosing due to age and weight, and is compliant with this.  Her brother helps the patient with her medications.    Review of Systems:  Review of Systems   Cardiovascular: Positive for palpitations. Negative for chest pain, dyspnea on exertion, leg swelling  and orthopnea.   Respiratory: Positive for shortness of breath.        Current Outpatient Medications on File Prior to Visit   Medication Sig Dispense Refill   • alendronate (Fosamax) 70 MG tablet Take 1 tablet by mouth Every 7 (Seven) Days. 4 tablet 11   • calcium carbonate-vitamin d (Calcium 600+D) 600-400 MG-UNIT per tablet Take 1 tablet by mouth Daily.     • Eliquis 2.5 MG tablet tablet TAKE ONE TABLET BY MOUTH EVERY 12 HOURS 180 tablet 4   • ezetimibe (ZETIA) 10 MG tablet TAKE ONE TABLET BY MOUTH DAILY 30 tablet 1   • MAGNESIUM PO Take  by mouth.       No current facility-administered medications on file prior to visit.       Allergies   Allergen Reactions   • Lipitor [Atorvastatin Calcium] Itching   • Sugar-Protein-Starch Dizziness     sensitive   • Atorvastatin Other (See Comments)     myalgias   • Brimonidine Other (See Comments)     Altered sensorium with disorientation (CNS depression)   • Corn Syrup Itching   • Diltiazem Dizziness     Lightheadedness   • Lactase GI Intolerance   • Soy Allergy GI Intolerance   • Wheat Bran GI Intolerance       Past Medical History:   Diagnosis Date   • Atrial fibrillation (HCC)    • Atrial fibrillation (HCC)    • Osteoporosis        Past Surgical History:   Procedure Laterality Date   • CAROTID ENDARTERECTOMY     • HIP SURGERY     • TONSILLECTOMY         Social History     Socioeconomic History   • Marital status: Single   Tobacco Use   • Smoking status: Never Smoker   • Smokeless tobacco: Never Used   Vaping Use   • Vaping Use: Never used   Substance and Sexual Activity   • Alcohol use: Yes     Comment: may 2 times a year/caffeine use    • Drug use: Defer   • Sexual activity: Defer       Family History   Problem Relation Age of Onset   • Heart disease Mother    • Other Mother         low blood sugar   • Heart attack Mother    • Diabetes Father    • Heart attack Father    • Diabetes Brother    • Heart disease Brother    • Heart disease Brother    • Cancer Brother    •  "Breast cancer Neg Hx    • Colon cancer Neg Hx        The following portions of the patient's history were reviewed and updated as appropriate: allergies, current medications, past family history, past medical history, past social history, past surgical history and problem list.       Objective:       Vitals:    04/11/22 1007   BP: 122/70   Pulse: 92   Weight: 58.2 kg (128 lb 3.2 oz)   Height: 162.6 cm (64\")         Physical Exam:  Constitutional:  Elderly, hard of hearing  HENT: Oropharynx clear and membrane moist  Eyes: Normal conjunctiva, no sclera icterus  Neck: Supple  Cardiovascular:  Irregular rate and rhythm, No Murmur, No bilateral lower extremity edema  Pulmonary: Normal respiratory effort, normal lung sounds, no wheezing  Neurological: Alert and orient x 3  Skin: Warm, dry, no ecchymosis, no rash  Psych: Appropriate mood and affect. Normal judgment and insight         Lab Results   Component Value Date     (H) 02/28/2022     (H) 03/04/2021    K 4.4 02/28/2022    K 4.4 03/04/2021     (H) 02/28/2022     03/04/2021    CO2 23 02/28/2022    CO2 31.1 (H) 03/04/2021    BUN 22 02/28/2022    BUN 18 03/04/2021    CREATININE 0.97 02/28/2022    CREATININE 0.88 03/04/2021    EGFRIFNONA 61 03/04/2021    EGFRIFNONA 76 10/05/2020    EGFRIFAFRI 74 03/04/2021    EGFRIFAFRI 92 10/05/2020    GLUCOSE 147 (H) 02/28/2022    GLUCOSE 74 03/04/2021    CALCIUM 9.5 02/28/2022    CALCIUM 10.3 03/04/2021    PROTENTOTREF 6.5 02/28/2022    PROTENTOTREF 6.5 03/04/2021    ALBUMIN 4.4 02/28/2022    ALBUMIN 4.40 03/04/2021    BILITOT <0.2 02/28/2022    BILITOT 0.4 03/04/2021    AST 15 02/28/2022    AST 21 03/04/2021    ALT 5 02/28/2022    ALT 7 03/04/2021     Lab Results   Component Value Date    WBC 8.6 02/28/2022    WBC 6.31 03/04/2021    HGB 13.4 02/28/2022    HGB 14.7 03/04/2021    HCT 41.6 02/28/2022    HCT 45.3 03/04/2021    MCV 94 02/28/2022    MCV 95.4 03/04/2021     02/28/2022     03/04/2021 "     Lab Results   Component Value Date    TRIG 116 02/28/2022    TRIG 75 03/04/2021    HDL 64 02/28/2022    HDL 75 (H) 03/04/2021     (H) 02/28/2022     (H) 03/04/2021     No results found for: PROBNP, BNP  No results found for: CKTOTAL, CKMB, CKMBINDEX, TROPONINI, TROPONINT  Lab Results   Component Value Date    TSH 0.988 02/28/2022    TSH 1.090 03/04/2021           ECG 12 Lead    Date/Time: 4/11/2022 10:51 AM  Performed by: Minna Desouza APRN  Authorized by: Minna Desouza APRN   Comparison: compared with previous ECG from 10/15/2022  Rhythm: sinus rhythm  Ectopy: atrial premature contractions  Rate: normal  QRS axis: normal  Comments: Sinus with very frequent PACs versus MAT            48 Hr Holter 11/4/2020:  · A normal monitor study.  · Underlying heart rhythm was sinus rhythm with an average heart rate of 79 bpm and a range of 58 bpm up to 107 bpm     Echocardiogram 11/2/2020:  · Calculated left ventricular EF = 51.6% Estimated left ventricular EF was in agreement with the calculated left ventricular EF. Normal global longitudinal LV strain (GLS) = -23%. Left ventricle strain data was reviewed by the physician and found to be accurate. Normal left ventricular cavity size and wall thickness noted. All left ventricular wall segments contract normally. Left ventricular diastolic function is consistent with (grade I) impaired relaxation.  · Moderate mitral annular calcification is present. There is moderate calcification of the mitral valve posterior leaflet(s). Mild mitral valve regurgitation is present.  · There is moderate calcification of the aortic valve. No significant aortic valve regurgitation is present. No hemodynamically significant aortic valve stenosis is present.    Carotid Dopplers 10/20/2020:  · Right internal carotid artery plaque without significant stenosis.  · Left internal carotid artery plaque without significant stenosis.         Assessment:          Diagnosis Plan    1. Paroxysmal atrial fibrillation (HCC)  Holter Monitor - 24 Hour    ECG 12 Lead   2. Carotid stenosis, asymptomatic, left     3. Mixed hyperlipidemia            Plan:       Paroxysmal atrial fibrillation:.  Patient is compliant with Eliquis 2.5 twice daily with no bleeding issues.  Today she is not in atrial fibrillation, but is having very frequent PACs and possibly an MAT.  Her heart rate is 92.  The patient is asymptomatic.  I will order a 24-hour Holter monitor to evaluate this further.  We can consider starting a low-dose beta-blocker based on this data.    Carotid stenosis: With history of left CEA.  Dopplers October 2020 reviewed.    Mixed hyperlipidemia: Lipid panel February 2022 reviewed.  Based on these results her primary care resumed Zetia    Patient seems to be doing well from a cardiac perspective.  She is reporting very infrequent palpitations.  She also reports anxiety.  Today she is in sinus with frequent PACs versus MAT.  I am ordering a 24-hour Holter monitor.  We can consider addition of low-dose beta-blocker.  Her blood pressure looks great today and her heart rate is controlled at 92.  She did have a recent fall out of bed.  She denies dizziness or palpitations at this time of this incident.  She had a CT head and neck which did not demonstrate any injury.  I will have her follow-up with Dr. Lainez in 1 year.  I will call her with Holter results and if we need to make any other changes.      Orders Placed This Encounter   Procedures   • Holter Monitor - 24 Hour     Standing Status:   Future     Number of Occurrences:   1     Standing Expiration Date:   4/11/2023     Order Specific Question:   Reason for exam?     Answer:   Palpitations     Order Specific Question:   Release to patient     Answer:   Immediate   • ECG 12 Lead     This order was created via procedure documentation     Order Specific Question:   Release to patient     Answer:   Immediate            Minna Desouza  MAYRA  [facility]  04/11/22  10:56 EDT

## 2022-05-11 NOTE — TELEPHONE ENCOUNTER
Caller: DEREK BORDEN    Relationship: Emergency Contact    Best call back number: [phone]    Requested Prescriptions:   Requested Prescriptions     Pending Prescriptions Disp Refills   • ezetimibe (ZETIA) 10 MG tablet 30 tablet 1     Sig: Take 1 tablet by mouth Daily.        Pharmacy where request should be sent: [facility] [address] AT [facility] [phone] [facility] [phone] FX     Additional details provided by patient: PATIENT HAS TWO LEFT IN THE BOTTLE.    PATIENTS BROTHER REQUESTING IF PATIENT CAN GET A 90 DAYS SUPPLY    Does the patient have less than a 3 day supply:  [x] Yes  [] No    Jerry Spangler Rep   05/11/22 10:10 EDT

## 2022-06-03 NOTE — TELEPHONE ENCOUNTER
Caller: DEREK BORDEN    Relationship: Emergency Contact    Best call back number: [phone]     Requested Prescriptions:   Requested Prescriptions     Pending Prescriptions Disp Refills   • apixaban (Eliquis) 2.5 MG tablet tablet 180 tablet 4     Sig: Take 1 tablet by mouth Every 12 (Twelve) Hours.        Pharmacy where request should be sent: [facility] [address] [place] [address] RD AT [facility] [phone] [facility] [phone] FX     Does the patient have less than a 3 day supply:  [x] Yes  [] No    Jerry Lantigua Rep   06/03/22 10:28 EDT

## 2022-06-10 NOTE — TELEPHONE ENCOUNTER
----- Message from MAYRA Ayoub sent at 3/3/2022 12:18 PM EST -----  No anemia or infection on CBC.  You do have some decrease in your kidney function along with increased sodium.  Please limit sodium in your diet by decreasing processed foods, canned items or processed frozen dinners.  Please make sure you are adequately staying hydrated.  Significant increase in cholesterol from prior readings.  Please make sure you are taking the daily Zetia and limiting fats, sweets and simple carbohydrates in your diet.  Thyroid is in normal range.  Urine did show some white blood cells with no blood or protein.  No bacterial growth on urine culture.  Please let me know if you are having any urinary frequency or burning with urination that is abnormal for you.  
DAFNETVM INSTRUCTING PT TO RETURN CALL TO BE READ DICTATION. LETTER SENT VIA Apprion. MM  
Calm

## 2022-06-15 NOTE — PROGRESS NOTES
I N T E R N A L  M E D I C I N E  [facility]      ENCOUNTER DATE:  06/15/2022    Anatoliy Tinsley / 86 y.o. / female        MEDICARE ANNUAL WELLNESS VISIT       Chief Complaint: Medicare Wellness-subsequent       Patient's general assessment of her health since a year ago:     - Compared to one year ago, she feels her physical health is about the same without significant change.    - Compared to one year ago, she feels her mental health is about the same without significant change.      HPI for other active medical problems:     Followed by Dr. Lainez cardiology for asymptomatic left carotid stenosis, atrial fibrillation and hyperlipidemia. Last seen by their office in December 2020.  On Eliquis for A. Fib due to memory and subsequent issues. Carotid stenosis status post left carotid enterectomy and possible history of stroke.  Occasional skipped beats or heart fluttering that only last for seconds.  Some intermittent dizziness with no raul syncopal or syncopal episodes. Most recent Holter monitor April 2022 was abnormal which showed frequent supraventricular ectopic beats.  Cardiology had recommended patient's start metoprolol assassinate XL 25 mg daily but patient and brother were unaware.  Further plans to  at the pharmacy and start patient on this medication.  Intolerance to statins, has restarted her Zetia 10 mg daily     Thinks she may have been diagnosed with ischemia or TIA many years ago prior to level.  Multiple episodes of aphasia and intermittent dizziness. Refusal of MRI of the brain and refusal to be seen by neurology at this time.      Osteoporosis, has restarted her Fosamax 70 mg daily, will be due for update in December of this year.  On vitamin D and calcium supplementation.    Patient declines all further health screenings and maintenance.  She is willing to have COVID booster today.    * The required components of Health Risk Assessment (HRA) that were completed by the patient  "and/or my staff are contained within this note and in the scanned documents titled \"Health Risk Assessment\" within the media section of the patient's chart in [facility].         HISTORY       Recent Hospitalizations:    Recent hospitalization?: No     If YES, location, date, and diagnoses:     · Location:   · Date:   · Principle Discharge Dx:   · Secondary Dx:       Patient Care Team:    Patient Care Team:  Gildardo Crum APRN as PCP - General (Internal Medicine)  Raphael Lainez MD as Consulting Physician (Cardiology)      Allergies:  Monosodium glutamate, Lipitor [atorvastatin calcium], Sugar-protein-starch, Atorvastatin, Brimonidine, Corn syrup, Diltiazem, Lactase, Soy allergy, and Wheat bran    Medications:  Current Outpatient Medications on File Prior to Visit   Medication Sig Dispense Refill   • alendronate (Fosamax) 70 MG tablet Take 1 tablet by mouth Every 7 (Seven) Days. 4 tablet 11   • apixaban (Eliquis) 2.5 MG tablet tablet Take 1 tablet by mouth Every 12 (Twelve) Hours. 180 tablet 4   • calcium carbonate-vitamin d (Calcium 600+D) 600-400 MG-UNIT per tablet Take 1 tablet by mouth Daily.     • ezetimibe (ZETIA) 10 MG tablet Take 1 tablet by mouth Daily. 30 tablet 1   • MAGNESIUM PO Take  by mouth.     • metoprolol succinate XL (TOPROL-XL) 25 MG 24 hr tablet Take 1 tablet by mouth Daily. 30 tablet 11     No current facility-administered medications on file prior to visit.        PFSH:     The following portions of the patient's history were reviewed and updated as appropriate: Allergies / Current Medications / Past Medical History / Surgical History / Social History / Family History    Problem List:  Patient Active Problem List   Diagnosis   • Paroxysmal atrial fibrillation (HCC)   • Carotid stenosis, asymptomatic, left   • Mixed hyperlipidemia   • Osteoporosis       Past Medical History:  Past Medical History:   Diagnosis Date   • Atrial fibrillation (HCC)    • Atrial fibrillation (HCC)    • Osteoporosis  " "      Past Surgical History:  Past Surgical History:   Procedure Laterality Date   • CAROTID ENDARTERECTOMY     • HIP SURGERY     • TONSILLECTOMY         Social History:  Social History     Socioeconomic History   • Marital status: Single   Tobacco Use   • Smoking status: Never Smoker   • Smokeless tobacco: Never Used   Vaping Use   • Vaping Use: Never used   Substance and Sexual Activity   • Alcohol use: Yes     Comment: may 2 times a year/caffeine use    • Drug use: Defer   • Sexual activity: Defer       Family History:  Family History   Problem Relation Age of Onset   • Heart disease Mother    • Other Mother         low blood sugar   • Heart attack Mother    • Diabetes Father    • Heart attack Father    • Diabetes Brother    • Heart disease Brother    • Heart disease Brother    • Cancer Brother    • Breast cancer Neg Hx    • Colon cancer Neg Hx          PATIENT ASSESSMENT     Vitals:  /90 (Cuff Size: Adult)   Pulse 88   Temp 97.3 °F (36.3 °C) (Temporal)   Ht 162.6 cm (64\")   Wt 61.4 kg (135 lb 6.4 oz)   SpO2 97%   Breastfeeding No   BMI 23.24 kg/m²   BP Readings from Last 3 Encounters:   06/15/22 142/90   04/11/22 122/70   03/18/22 138/80     Wt Readings from Last 3 Encounters:   06/15/22 61.4 kg (135 lb 6.4 oz)   04/11/22 58.2 kg (128 lb 3.2 oz)   03/18/22 57.7 kg (127 lb 3.2 oz)      Body mass index is 23.24 kg/m².    There were no vitals filed for this visit.      Review of Systems:    Review of Systems  Constitutional neg except per HPI   ENT neg   Neuro memory los baseline, neg headache/dizziness     Physical Exam:    Physical Exam  Constitutional  No distress  Cardiovascular Rate  normal . Rhythm: irregular . Heart sounds:  normal  Pulmonary/Chest  Effort normal. Breath sounds:  normal  Psychiatric  Alert.  Reviewed Data:    Labs:   Lab Results   Component Value Date     (H) 02/28/2022    K 4.4 02/28/2022    CALCIUM 9.5 02/28/2022    AST 15 02/28/2022    ALT 5 02/28/2022    BUN 22 " 02/28/2022    CREATININE 0.97 02/28/2022    CREATININE 0.88 03/04/2021    CREATININE 0.73 10/05/2020    EGFRIFNONA 61 03/04/2021    EGFRIFAFRI 74 03/04/2021       Lab Results   Component Value Date     (H) 03/04/2020     (H) 03/02/2020    GLU 80 06/17/2019    HGBA1C 5.30 10/05/2020    HGBA1C 6.0 (H) 04/25/2019    HGBA1C 6.0 (H) 07/23/2018       Lab Results   Component Value Date     (H) 02/28/2022     (H) 03/04/2021     (H) 10/05/2020    HDL 64 02/28/2022    TRIG 116 02/28/2022    CHOLHDLRATIO 3.9 02/28/2022       Lab Results   Component Value Date    TSH 0.988 02/28/2022    FREET4 1.24 02/28/2022          Lab Results   Component Value Date    WBC 8.6 02/28/2022    HGB 13.4 02/28/2022    HGB 14.7 03/04/2021    HGB 14.5 10/05/2020     02/28/2022                 No results found for: PSA    Imaging:          Medical Tests:          Screening for Glaucoma:  Previous screening for glaucoma?: No; plans to schedule       Hearing Loss Screen:  Finger Rub Hearing Test (right ear): Failed  Finger Rub Hearing Test (left ear): Failed       Urinary Incontinence Screen:  Episodes of urinary incontinence? : Yes and Will need close follow-up.       Depression Screen:  PHQ-2/PHQ-9 Depression Screening 6/15/2022   Retired PHQ-9 Total Score -   Retired Total Score -   Little Interest or Pleasure in Doing Things 0-->not at all   Feeling Down, Depressed or Hopeless 0-->not at all   PHQ-9: Brief Depression Severity Measure Score 0        PHQ-2: 0 (Not depressed)    PHQ-9: 0 (Negative screening for depression)       FUNCTIONAL, FALL RISK, & COGNITIVE SCREENING (Components below):    DATA:    Functional & Cognitive Status 6/15/2022   Do you have difficulty preparing food and eating? No   Do you have difficulty bathing yourself, getting dressed or grooming yourself? No   Do you have difficulty using the toilet? No   Do you have difficulty moving around from place to place? No   Current Diet Well  Balanced Diet   Dental Exam Up to date   Eye Exam Up to date   Exercise (times per week) 7 times per week   Current Exercises Include Walking   Do you need help using the phone?  No   Are you deaf or do you have serious difficulty hearing?  Yes   Do you need help with transportation? Yes   Do you need help shopping? No   Do you need help preparing meals?  No   Do you need help with housework?  No   Do you need help with laundry? No   Do you need help managing money? Yes   Do you ever drive or ride in a car without wearing a seat belt? No         A) Assessment of Functional Ability:  (Assessment of ability to perform ADL's (showering/bathing, using toilet, dressing, feeding self, moving self around) and IADL's (use telephone, shop, prepare food, housekeep, do laundry, transport independently, take medications independently, and handle finances)    Degree of functional impairment: MODERATE (based on assessment noted above)      B) Assessment of Fall Risk:  Fall Risk Assessment was completed, and patient is at moderate risk for falls.       Need for further evaluation of gait, strength, and balance? : Will need close follow-up.     Timed Up and Go (TUG):   (>= 12 seconds indicates high risk for falling)    Observable abnormalities included: slow tentative pace       C. Assessment of Cognitive Function:    Mini-Cog Test:     1) Registration (3 objects): No and Will need close follow-up.    2) Number of objects recalled: 1   3) Clock Draw: Passed? : No       Further evaluation required? : Will need close follow-up.         COUNSELING       A. Identification of Health Risk Factors:    Risk factors include: cardiovascular risk factors and poor hearing      B. Age-Appropriate Screening Schedule:  (Refer to the list below for future screening recommendations based on patient's age, sex and/or medical conditions. Orders for these recommended tests are listed in the plan section. The patient has been provided with a written  plan)    Health Maintenance Topics  Health Maintenance   Topic Date Due   • ZOSTER VACCINE (1 of 2) Never done   • INFLUENZA VACCINE  10/01/2022   • DXA SCAN  12/14/2022   • LIPID PANEL  02/28/2023   • TDAP/TD VACCINES (6 - Td or Tdap) 03/05/2030       Health Maintenance Topics Due or Over-Due  Health Maintenance Due   Topic Date Due   • ZOSTER VACCINE (1 of 2) Never done         C. Advanced Care Planning:    Advance Care Planning   ACP discussion was held with the patient during this visit. Patient has an advance directive (not in EMR), copy requested.       D. Patient Self-Management and Personalized Health Advice:    She has been provided with personalized counseling/information (including brochures/handouts) about:     -- improving exercise / conditioning, reducing risk for cardiovascular disease (heart, stroke, vascular) and possible evidence of cognitive problems and need for ongoing surveillance for progressive changes      She has been recommended for the following preventative services which has been performed today, will be ordered today or ordered/performed on upcoming follow-up visit:     -- NUTRITION counseling provided, EXERCISE counseling provided, EYE exam for glaucoma screening recommended, CARDIOVASCULAR disease risk reduction counseling performed, FALL RISK assessment / plan of care completed, URINARY incontinence assessment done, OSTEOPOROSIS screening DISCUSSED, COVID-19 vaccination (and/or booster) recommendations provided      E. Miscellaneous Items:    -Aspirin use counseling: Does not need ASA (and currently is not on it)    -Discussed BMI with her. The BMI is in the acceptable range    -Reviewed use of high risk medication in the elderly: YES    -Reviewed for potential of harmful drug interactions in the elderly: YES        WRAP UP       Assessment & Plan:  1) MEDICARE ANNUAL WELLNESS VISIT    2) OTHER MEDICAL CONDITIONS ADDRESSED TODAY:            Problem List Items Addressed This Visit     None     Visit Diagnoses     Hyperlipidemia, unspecified hyperlipidemia type    -  Primary    Relevant Orders    Comprehensive Metabolic Panel    Lipid Panel With / Chol / HDL Ratio                    Orders Placed This Encounter   Procedures   • COVID-19 Vaccine (Pfizer) Gray Cap   • Comprehensive Metabolic Panel   • Lipid Panel With / Chol / HDL Ratio       Discussion / Summary:  · Continue current medication regimen for hyperlipidemia and osteoporosis, patient encouraged to continue anticoagulation and start metoprolol as prescribed by cardiology  · We will order DEXA scan to be performed in December for updates  · Declines all further health maintenance  · Acceptable to COVID second booster today   · follow-up in 4 months for chronic medical    Medications as of TODAY:              Current Outpatient Medications   Medication Sig Dispense Refill   • alendronate (Fosamax) 70 MG tablet Take 1 tablet by mouth Every 7 (Seven) Days. 4 tablet 11   • apixaban (Eliquis) 2.5 MG tablet tablet Take 1 tablet by mouth Every 12 (Twelve) Hours. 180 tablet 4   • calcium carbonate-vitamin d (Calcium 600+D) 600-400 MG-UNIT per tablet Take 1 tablet by mouth Daily.     • ezetimibe (ZETIA) 10 MG tablet Take 1 tablet by mouth Daily. 30 tablet 1   • MAGNESIUM PO Take  by mouth.     • metoprolol succinate XL (TOPROL-XL) 25 MG 24 hr tablet Take 1 tablet by mouth Daily. 30 tablet 11     No current facility-administered medications for this visit.         FOLLOW-UP:            Return in about 4 months (around 10/15/2022) for Next scheduled follow up.                 Future Appointments   Date Time Provider Department Center   10/20/2022  8:45 AM Gildardo Crum APRN [facility]   4/20/2023  9:15 AM Raphael Lainez MD [facility]           After Visit Summary (AVS) including the Personalized Prevention  Plan Services (PPPS) was either printed and given to the patient at check-out today and/or sent to [facility] for review.        *Examiner was wearing medical surgical mask, face shield and exam gloves during the entire duration of the visit. Patient was masked the entire time.   Minimum social distance of 6 ft maintained entire visit except if physical contact was necessary as documented.      **Dragon Disclaimer:   Much of this encounter note is an electronic transcription/translation of spoken language to printed text. The electronic translation of spoken language may permit erroneous, or at times, nonsensical words or phrases to be inadvertently transcribed. Although I have reviewed the note for such errors, some may still exist.

## 2022-09-16 NOTE — ED TRIAGE NOTES
Pt was brought in by ems from [facility] for fall. Pt unwitnessed fall last night. Pt c/o left shoulder pain and holding left side of head. Pt takes eliquis.    Pt wearing mask on arrival. Staff wearing mask and goggles at time of triage.

## 2022-09-16 NOTE — ED PROVIDER NOTES
EMERGENCY DEPARTMENT ENCOUNTER    Room Number:  HC1/E  Date of encounter:  9/17/2022  PCP: Gildardo Crum APRN  Historian: Patient, EMS and brother      HPI:  Chief Complaint: Fall    A complete HPI/ROS/PMH/PSH/SH/FH are unobtainable due to: Dementi    Context: Anatoliy Tinsley is a 86 y.o. female who presents to the ED via EMS from her dementia care unit.  Per report the patient had an unwitnessed fall last night and initially had no complaints but today began complaining of right-sided headache and left arm pain.  The patient is on Eliquis and thus this was sent to the emergency room for further evaluation.  Currently the patient complains of right-sided headache, left upper arm, forearm and hand pain.  Patient is at her neurologic baseline per her brother, EMS and nursing home staff.      PAST MEDICAL HISTORY  Active Ambulatory Problems     Diagnosis Date Noted   • Paroxysmal atrial fibrillation (HCC) 10/15/2020   • Carotid stenosis, asymptomatic, left 10/15/2020   • Mixed hyperlipidemia 10/15/2020   • Osteoporosis      Resolved Ambulatory Problems     Diagnosis Date Noted   • No Resolved Ambulatory Problems     Past Medical History:   Diagnosis Date   • Atrial fibrillation (HCC)    • Atrial fibrillation (HCC)          PAST SURGICAL HISTORY  Past Surgical History:   Procedure Laterality Date   • CAROTID ENDARTERECTOMY     • HIP SURGERY     • TONSILLECTOMY           FAMILY HISTORY  Family History   Problem Relation Age of Onset   • Heart disease Mother    • Other Mother         low blood sugar   • Heart attack Mother    • Diabetes Father    • Heart attack Father    • Diabetes Brother    • Heart disease Brother    • Heart disease Brother    • Cancer Brother    • Breast cancer Neg Hx    • Colon cancer Neg Hx          SOCIAL HISTORY  Social History     Socioeconomic History   • Marital status: Single   Tobacco Use   • Smoking status: Never Smoker   • Smokeless tobacco: Never Used   Vaping Use   • Vaping Use: Never used    Substance and Sexual Activity   • Alcohol use: Yes     Comment: may 2 times a year/caffeine use    • Drug use: Defer   • Sexual activity: Defer         ALLERGIES  Monosodium glutamate, Lipitor [atorvastatin calcium], Sugar-protein-starch, Atorvastatin, Brimonidine, Corn syrup, Diltiazem, Lactase, Soy allergy, and Wheat bran        REVIEW OF SYSTEMS  Review of Systems     Unable due to dementia  All systems reviewed and negative except for those discussed in HPI.     PHYSICAL EXAM    I have reviewed the triage vital signs and nursing notes.    ED Triage Vitals [09/16/22 1840]   Temp Heart Rate Resp BP SpO2   99.1 °F (37.3 °C) 86 16 140/90 95 %      Temp src Heart Rate Source Patient Position BP Location FiO2 (%)   Oral Monitor -- -- --       GENERAL: 86-year-old well developed, well nourished in no acute distress  HENT: NCAT with tenderness to right parietal region, neck supple, trachea midline: No C-spine tenderness with full range of motion of the neck without pain  EYES: no scleral icterus, PERRL, normal conjunctivae  ABDOMEN: soft, nontender, nondistended, bowel sounds present  MUSCULOSKELETAL: no gross deformity, no pedal edema, no calf tenderness: No thoracic or lumbar spine tenderness: The patient has old bruising to her anterior left shoulder that is yellow in color.  She has minimal tenderness to palpation in the left humerus and left forearm and mild tenderness to palpation over her left second MCP joint but is neurovascular intact in her left upper extremity  NEURO: alert,  sensory and motor function of extremities intact, speech clear, mental status normal per her brother  SKIN: warm, dry, no rash  PSYCH:  Appropriate mood and affect      PPE  Pt does not present with symptoms for COVID19; however, I was wearing a mask and goggles throughout all patient interaction.    Vital signs and nursing notes reviewed.      LAB RESULTS  No results found for this or any previous visit (from the past 24  hour(s)).    Ordered the above labs and independently reviewed the results.        RADIOLOGY  XR Shoulder 2+ View Left    Result Date: 9/16/2022  LEFT SHOULDER, 2 VIEWS  HISTORY: Fall, left shoulder pain  COMPARISON: None available  FINDINGS: No evidence for acute fracture or dislocation. There is some narrowing of the AC joint.      No evidence of acute fracture or dislocation  This report was finalized on 9/16/2022 7:53 PM by Dr. Geoffrey Mclaughlin M.D.      XR Humerus Left    Result Date: 9/16/2022  LEFT HUMERUS, 2 VIEWS  HISTORY: Fall, left arm pain  COMPARISON: None available  FINDINGS: No evidence for acute fracture or dislocation. Surrounding soft tissue structures appear unremarkable.      No acute finding  This report was finalized on 9/16/2022 7:54 PM by Dr. Geoffrey Mclaughlin M.D.      XR Forearm 2 View Left    Result Date: 9/16/2022  LEFT FOREARM, 2 VIEWS  HISTORY: Fall, left forearm pain  COMPARISON: None available  FINDINGS: No evidence of fracture or malalignment. Soft tissue structures appear unremarkable. Vascular calcifications      No acute findings  This report was finalized on 9/16/2022 7:55 PM by Dr. Geoffrey Mclaughlin M.D.      XR Hand 3+ View Left    Result Date: 9/16/2022  LEFT ANKLE, 3 VIEWS  HISTORY: Fall, left hand pain  COMPARISON: None  FINDINGS: No evidence of fracture or dislocation. Osteoarthritic degenerative changes at the base of the thumb and also within multiple IP joints. Soft tissue structures are unremarkable      No acute findings  This report was finalized on 9/16/2022 7:57 PM by Dr. Geoffrey Mclaughlin M.D.      CT Head Without Contrast    Result Date: 9/16/2022  HEAD CT WITHOUT CONTRAST  HISTORY: Unwitnessed fall, patient on anticoagulation  TECHNIQUE: Radiation dose reduction techniques were utilized, including automated exposure control and exposure modulation based on body size. Axial images were obtained from the skull base to vertex without IV contrast.  COMPARISON: 03/11/2022   FINDINGS: No evidence for intracranial hemorrhage, acute cortical-based infarction, focal mass lesion or hydrocephalus. Stable chronic patchy hypodensities in the subcortical and periventricular white matter likely reflecting chronic small vessel ischemia. Stable age-appropriate volume loss. Minimal left inferior frontal scalp hematoma. Prior cataract surgeries are seen. Carotid siphon calcifications. The sinuses and mastoids are clear. Calvarium intact.      No acute intracranial abnormality  Radiation dose reduction techniques were utilized, including automated exposure control and exposure modulation based on body size.  This report was finalized on 9/16/2022 7:49 PM by Dr. Geoffrey Mclaughlin M.D.        I ordered the above noted radiological studies. Independently reviewed by me and discussed with radiologist.  See dictation above for official radiology interpretation.      PROCEDURES    Procedures        MEDICATIONS GIVEN IN ER    Medications - No data to display      PROGRESS, DATA ANALYSIS, CONSULTS, AND MEDICAL DECISION MAKING    All labs have been independently reviewed by me.  All radiology studies have been reviewed by me and discussed with radiologist dictating report.   EKG's independently reviewed by me.  Discussion below represents my analysis of pertinent findings related to patient's condition, differential diagnosis, treatment plan and final disposition.      ED Course as of 09/17/22 0158   Fri Sep 16, 2022   1953 Head CT was read as negative acute [GP]   2013 The patient's left shoulder, left humerus, left forearm and left hand are negative acute. [GP]   2013 Patient's imaging has been negative and she is at her neurologic baseline per her brother.  I believe she is stable for discharge back to her facility at this time. [GP]      ED Course User Index  [GP] Holger Mack MD           The differential diagnosis includes but is not limited to skull fracture, intracranial hemorrhage, shoulder  fracture, shoulder dislocation, humerus fracture, forearm fracture hand fracture or contusion        AS OF 01:58 EDT VITALS:    BP - 104/75  HR - 86  TEMP - 99.1 °F (37.3 °C) (Oral)  02 SATS - 95%        DIAGNOSIS  Final diagnoses:   Fall, initial encounter   Closed head injury, initial encounter   Left arm pain         DISPOSITION  Discharged        EMR Dragon/Transcription disclaimer:   Much of this encounter note is an electronic transcription/translation of spoken language to printed text.        Holger Mack MD  09/17/22 0158

## 2022-09-17 NOTE — ED NOTES
2 envelopes of paperwork found in wheelchair after patient discharge. Brother Edgar called and reports he will return to ER to  envelopes. Envelopes placed in triage with patient label.

## 2022-09-17 NOTE — DISCHARGE INSTRUCTIONS
Ice to areas that hurt.  Tylenol for pain.  Follow-up your doctor next week if not better.  Return if worse.

## 2022-11-06 NOTE — CASE MANAGEMENT/SOCIAL WORK
1544- spoke w/DAVID Ding about transport needs for patient back to facility- she advised she has spoken with brother who is in route to  patient. Requested to speak w/brother upon arrival. Alba Lombardo RN      4825- Brother Edgar arrived, entered room, introduced self and explained role. Patient is confused consistent w/history of dementia. Spoke w/brother about how patient usually is transported- he noted he has picked her up previously. He advised he is POA- this CCP unable to find POA paperwork on file- noted he is patients emergency contact in our records and [facility]'s records in Epic; Brother advised he has paperwork with him- provided financial POA paperwork and Advance directive with brother noted as healthcare surrogate.- gave to registration to scan into system. Patient was d/c w/brother with no further needs at this time. Alba Lombardo RN

## 2022-11-06 NOTE — ED PROVIDER NOTES
EMERGENCY DEPARTMENT ENCOUNTER    Room Number:  13/13  Date of encounter:  11/6/2022  PCP: Gildardo Crum APRN  Historian: Patient      HPI:  Chief Complaint: Abdominal pain  A complete HPI/ROS/PMH/PSH/SH/FH are unobtainable due to: Patient with dementia    Context: Anatoliy Tinsley is a 86 y.o. female who presents to the ED c/o left lower quadrant pain.  Patient has history of dementia and is very poor historian.  Reports some nausea but no vomiting.      PAST MEDICAL HISTORY  Active Ambulatory Problems     Diagnosis Date Noted   • Paroxysmal atrial fibrillation (HCC) 10/15/2020   • Carotid stenosis, asymptomatic, left 10/15/2020   • Mixed hyperlipidemia 10/15/2020   • Osteoporosis      Resolved Ambulatory Problems     Diagnosis Date Noted   • No Resolved Ambulatory Problems     Past Medical History:   Diagnosis Date   • Atrial fibrillation (HCC)    • Atrial fibrillation (HCC)    • Dementia (HCC)          PAST SURGICAL HISTORY  Past Surgical History:   Procedure Laterality Date   • CAROTID ENDARTERECTOMY     • HIP SURGERY     • TONSILLECTOMY           FAMILY HISTORY  Family History   Problem Relation Age of Onset   • Heart disease Mother    • Other Mother         low blood sugar   • Heart attack Mother    • Diabetes Father    • Heart attack Father    • Diabetes Brother    • Heart disease Brother    • Heart disease Brother    • Cancer Brother    • Breast cancer Neg Hx    • Colon cancer Neg Hx          SOCIAL HISTORY  Social History     Socioeconomic History   • Marital status: Single   Tobacco Use   • Smoking status: Never   • Smokeless tobacco: Never   Vaping Use   • Vaping Use: Never used   Substance and Sexual Activity   • Alcohol use: Yes     Comment: may 2 times a year/caffeine use    • Drug use: Defer   • Sexual activity: Defer         ALLERGIES  Monosodium glutamate, Lipitor [atorvastatin calcium], Sugar-protein-starch, Atorvastatin, Brimonidine, Corn syrup, Diltiazem, Soy allergy, Tilactase, and Wheat  bran        REVIEW OF SYSTEMS  Review of Systems     All systems reviewed and negative except for those discussed in HPI.       PHYSICAL EXAM    I have reviewed the triage vital signs and nursing notes.    ED Triage Vitals   Temp Heart Rate Resp BP SpO2   11/06/22 0019 11/06/22 0019 11/06/22 0019 11/06/22 0019 11/06/22 0019   98.6 °F (37 °C) 88 18 160/92 96 %      Temp src Heart Rate Source Patient Position BP Location FiO2 (%)   11/06/22 0019 11/06/22 0019 11/06/22 0205 11/06/22 0205 --   Oral Monitor Lying Right arm        Physical Exam  GENERAL: not distressed  HENT: nares patent  EYES: no scleral icterus  CV: regular rhythm, regular rate  RESPIRATORY: normal effort  ABDOMEN: soft, soft mild left lower quadrant tenderness no rebound or guarding  MUSCULOSKELETAL: no deformity  NEURO: alert, oriented x1, moves all extremities, follows commands  SKIN: warm, dry        LAB RESULTS  Recent Results (from the past 24 hour(s))   Comprehensive Metabolic Panel    Collection Time: 11/06/22  2:08 AM    Specimen: Blood   Result Value Ref Range    Glucose 100 (H) 65 - 99 mg/dL    BUN 17 8 - 23 mg/dL    Creatinine 0.83 0.57 - 1.00 mg/dL    Sodium 142 136 - 145 mmol/L    Potassium 4.5 3.5 - 5.2 mmol/L    Chloride 104 98 - 107 mmol/L    CO2 32.3 (H) 22.0 - 29.0 mmol/L    Calcium 9.7 8.6 - 10.5 mg/dL    Total Protein 7.1 6.0 - 8.5 g/dL    Albumin 4.20 3.50 - 5.20 g/dL    ALT (SGPT) 10 1 - 33 U/L    AST (SGOT) 22 1 - 32 U/L    Alkaline Phosphatase 152 (H) 39 - 117 U/L    Total Bilirubin 0.3 0.0 - 1.2 mg/dL    Globulin 2.9 gm/dL    A/G Ratio 1.4 g/dL    BUN/Creatinine Ratio 20.5 7.0 - 25.0    Anion Gap 5.7 5.0 - 15.0 mmol/L    eGFR 69.2 >60.0 mL/min/1.73   Lipase    Collection Time: 11/06/22  2:08 AM    Specimen: Blood   Result Value Ref Range    Lipase 60 13 - 60 U/L   Urinalysis With Microscopic If Indicated (No Culture) - Urine, Clean Catch    Collection Time: 11/06/22  2:08 AM    Specimen: Urine, Clean Catch   Result Value Ref  Range    Color, UA Yellow Yellow, Straw    Appearance, UA Clear Clear    pH, UA 7.5 5.0 - 8.0    Specific Gravity, UA 1.016 1.005 - 1.030    Glucose, UA Negative Negative    Ketones, UA Negative Negative    Bilirubin, UA Negative Negative    Blood, UA Negative Negative    Protein, UA Negative Negative    Leuk Esterase, UA Negative Negative    Nitrite, UA Negative Negative    Urobilinogen, UA 0.2 E.U./dL 0.2 - 1.0 E.U./dL   Lactic Acid, Plasma    Collection Time: 11/06/22  2:08 AM    Specimen: Blood   Result Value Ref Range    Lactate 1.0 0.5 - 2.0 mmol/L   CBC Auto Differential    Collection Time: 11/06/22  2:08 AM    Specimen: Blood   Result Value Ref Range    WBC 9.63 3.40 - 10.80 10*3/mm3    RBC 4.23 3.77 - 5.28 10*6/mm3    Hemoglobin 13.2 12.0 - 15.9 g/dL    Hematocrit 41.7 34.0 - 46.6 %    MCV 98.6 (H) 79.0 - 97.0 fL    MCH 31.2 26.6 - 33.0 pg    MCHC 31.7 31.5 - 35.7 g/dL    RDW 13.1 12.3 - 15.4 %    RDW-SD 47.6 37.0 - 54.0 fl    MPV 10.7 6.0 - 12.0 fL    Platelets 366 140 - 450 10*3/mm3    Neutrophil % 76.4 (H) 42.7 - 76.0 %    Lymphocyte % 14.2 (L) 19.6 - 45.3 %    Monocyte % 7.7 5.0 - 12.0 %    Eosinophil % 1.0 0.3 - 6.2 %    Basophil % 0.3 0.0 - 1.5 %    Immature Grans % 0.4 0.0 - 0.5 %    Neutrophils, Absolute 7.35 (H) 1.70 - 7.00 10*3/mm3    Lymphocytes, Absolute 1.37 0.70 - 3.10 10*3/mm3    Monocytes, Absolute 0.74 0.10 - 0.90 10*3/mm3    Eosinophils, Absolute 0.10 0.00 - 0.40 10*3/mm3    Basophils, Absolute 0.03 0.00 - 0.20 10*3/mm3    Immature Grans, Absolute 0.04 0.00 - 0.05 10*3/mm3    nRBC 0.0 0.0 - 0.2 /100 WBC       Ordered the above labs and independently reviewed the results.        RADIOLOGY  CT Head Without Contrast    Result Date: 11/6/2022  Patient: POORNIMA MORENO  Time Out: 06:46 Exam(s): CT HEAD Without Contrast EXAM:   CT Head Without Intravenous Contrast CLINICAL HISTORY:    Reason for exam: ams. TECHNIQUE:   Axial computed tomography images of the head brain without intravenous  contrast.  CTDI is 14.6 mGy and DLP is 750 mGy-cm.  This CT exam was performed according to the principle of ALARA (As Low As Reasonably Achievable) by using one or more of the following dose reduction techniques: automated exposure control, adjustment of the mA and or kV according to patient size, and or use of iterative reconstruction technique. COMPARISON:   10 27 2022 FINDINGS:   Brain:  There is no acute intracranial hemorrhage.  No evidence of acute infarct-no mass effect or edema.  No extra-axial fluid collection.  There is stable mild to moderate diffuse atrophy.  There are stable hypodensities scattered throughout the white matter consistent with moderate to extensive old small vessel ischemic changes.   Ventricles:  Unremarkable.  No ventriculomegaly.   Bones joints:  Unremarkable.  No acute fracture.   Soft tissues:  Unremarkable.   Sinuses:  Unremarkable as visualized.  No acute sinusitis.   Mastoid air cells:  Unremarkable as visualized.  No mastoid effusion. IMPRESSION:       No acute intracranial abnormality.    Electronically signed by Floresita Chaves MD on 11-06-22 at 0646    CT Abdomen Pelvis With Contrast    Result Date: 11/6/2022  Patient: POORNIMA MORENO  Time Out: 06:55 Exam(s): CT ABDOMEN + PELVIS With Contrast EXAM:   CT Abdomen and Pelvis With Intravenous Contrast CLINICAL HISTORY:    Reason for exam: abd pain. TECHNIQUE:   Axial computed tomography images of the abdomen and pelvis with intravenous contrast.  CTDI is 14.6 mGy and DLP is 750 mGy-cm.  This CT exam was performed according to the principle of ALARA (As Low As Reasonably Achievable) by using one or more of the following dose reduction techniques: automated exposure control, adjustment of the mA and or kV according to patient size, and or use of iterative reconstruction technique. COMPARISON:   No relevant prior studies available. FINDINGS:   Lung bases:  Unremarkable.  No mass.  No consolidation.  ABDOMEN:   Liver:  Unremarkable.   No mass.   Gallbladder and bile ducts:  Unremarkable.  No calcified stones.  No ductal dilation.   Pancreas:  Unremarkable.  No mass.  No ductal dilation.   Spleen:  Calcified granulomas noted in the spleen, which is otherwise unremarkable.   Adrenals:  Unremarkable.  No mass.   Kidneys and ureters:  There is no hydronephrosis.  There are cystic areas in the right renal hilum, suggestive of parapelvic cysts.  There are also subcentimeter hypodensities in both kidneys which are too small to characterize, also likely benign cysts.  The ureters are unremarkable as seen.   Stomach and bowel:  Unremarkable.  No obstruction.  No mucosal thickening.  PELVIS:   Appendix:  No findings to suggest acute appendicitis.   Bladder:  Unremarkable.  No mass.   Reproductive:  The uterus is enlarged with calcifications, likely calcified uterine fibroids.  ABDOMEN and PELVIS:   Intraperitoneal space:  Unremarkable.  No free air.  No significant fluid collection.   Bones joints:  There are postsurgical changes from bilateral hip arthroplasty with associated streak artifact.   Soft tissues:  There is an oval shaped mass in the subcutaneous fat of the right lateral aspect of the mid to lower abdominal wall.  It measures 5.6 x 4.0 x 3.0 cm in size and 60 Hounsfield units in density.  There is mild infiltration of the surrounding subcutaneous fat.  It has an appearance suggestive of a hematoma.-Correlate clinically for possible traumatic injury to this region.  There are calcifications within prominent soft tissue musculature just lateral to the left iliac bone, likely related to old traumatic injury.   Vasculature:  Unremarkable.  No abdominal aortic aneurysm.   Lymph nodes:  Unremarkable.  No enlarged lymph nodes. IMPRESSION:     1.  Slightly hyperdense nodule within the subcutaneous fat of the right lateral abdominal wall, possibly representing a hematoma.  Correlate with clinical history findings. 2.  No evidence of acute abnormality  within the abdomen and pelvis.  No bowel obstruction.  No hydronephrosis.    Electronically signed by Floresita Chaves MD on 11-06-22 at 0655      I ordered the above noted radiological studies. Reviewed by me and discussed with radiologist.  See dictation for official radiology interpretation.      PROCEDURES    Procedures      MEDICATIONS GIVEN IN ER    Medications   sodium chloride 0.9 % flush 10 mL (has no administration in time range)   ziprasidone (GEODON) injection 10 mg (10 mg Intramuscular Given 11/6/22 0316)   iopamidol (ISOVUE-300) 61 % injection 100 mL (85 mL Intravenous Given 11/6/22 0424)         PROGRESS, DATA ANALYSIS, CONSULTS, AND MEDICAL DECISION MAKING    All labs have been independently reviewed by me.  All radiology studies have been reviewed by me and discussed with radiologist dictating the report.   EKG's independently viewed and interpreted by me.  Discussion below represents my analysis of pertinent findings related to patient's condition, differential diagnosis, treatment plan and final disposition.        ED Course as of 11/06/22 0727   Sun Nov 06, 2022   0626 Patient is cooperative baseline mental status is alert and oriented x1.  Patient appears to be at baseline.  CT pending. [TJ]   0633 WBC: 9.63 [TJ]   0633 Hemoglobin: 13.2 [TJ]   0633 Lipase: 60 [TJ]   0633 Lactate: 1.0 [TJ]   0633 Creatinine: 0.83 [TJ]   0633 Potassium: 4.5 [TJ]   0645 Care to Dr Brooke.  Will fu imaging. [TJ]   0703 I assumed care of patient from Dr. Samuel at the end of his shift with plan to discharge patient home if the CT abdomen pelvis and CT brain without contrast were negative for acute intracranial abnormality or acute intraperitoneal pathology.  I reviewed these images and reports.  She does appear to have a subcutaneous hematoma in the right lateral abdominal wall.  Patient was reportedly having pain in the left lower quadrant.  I reexamined the patient.  There is some right lower abdominal wall  ecchymosis.  There is no palpable thrill over this region.  Does not appear to be rapidly expanding.  Review of patient's home medications reveals that she is on Eliquis.  Hemoglobin is within normal limits today at 13.  She has been here for 7 hours and 51 minutes and has remained hemodynamically stable.  I do not suspect brisk ongoing bleeding.  She is appropriate for discharge back to nursing facility at this time. [JR]      ED Course User Index  [JR] Vasile Brooke MD  [TJ] Luis Samuel MD           PPE: The patient wore a surgical mask throughout the entire patient encounter. I wore an N95.    AS OF 07:27 EST VITALS:    BP - 149/95  HR - 90  TEMP - 98.6 °F (37 °C) (Oral)  O2 SATS - 94%        DIAGNOSIS  Final diagnoses:   Left lower quadrant abdominal pain   Nausea   Dementia without behavioral disturbance, psychotic disturbance, mood disturbance, or anxiety, unspecified dementia severity, unspecified dementia type (HCC)   Abdominal wall hematoma, initial encounter   Chronic anticoagulation         DISPOSITION  Discharge           Luis Samuel MD  11/06/22 0705       Luis Samuel MD  11/06/22 0725

## 2022-11-09 NOTE — OUTREACH NOTE
AMBULATORY CASE MANAGEMENT NOTE    Name and Relationship of Patient/Support Person: DEREK BORDEN - Emergency Contact    Introduced self, explained ACM RN role and provided contact information. Spoke with patient's brother regarding patient's health and wellness after ED. No needs at this time. Patient is okay per brother. Follow up review scheduled in 1 month.     Education Documentation  No documentation found.        KANDY RIVERA  Ambulatory Case Management    11/9/2022, 10:38 EST

## 2022-11-12 NOTE — ED TRIAGE NOTES
Pt to ED via  EMS from [place] on [facility]. Pt fell approximately 1 hour PTA. Pt with obvious deformity to her nose. No blood thinners on patient's established medicine list. Pt placed in C-collar by EMS. Pt placed on bed alarm on arrival. Pt pleasantly confused at time of triage.

## 2022-11-12 NOTE — DISCHARGE INSTRUCTIONS
You have been seen today in the ER for head injury.  Your imaging in the emergency department was negative for any acute intracranial pathology.  Because you are on a anticoagulant, there is a small risk of a delayed intracranial bleed for up to 1 week from the time of injury.  Please have family members keep a close eye on you over the next several days. Ice to nose for 24 hours. Tylenol as needed for pain.  Please make sure you have close follow-up with your primary care physician.  Follow up with ENT regarding nasal bone fracture. Please return to the emergency department immediately for headache, visual disturbance, confusion, weakness, nausea or vomiting or any other concerning symptoms

## 2022-11-12 NOTE — ED PROVIDER NOTES
EMERGENCY DEPARTMENT ENCOUNTER    Room Number:  06/06  Date of encounter:  11/12/2022  PCP: Gildardo Crum APRN  Historian: EMS report      PPE    Patient was placed in face mask in first look. Patient was wearing facemask when I entered the room and throughout our encounter. I wore full protective equipment throughout this patient encounter including a face mask, and gloves. Hand hygiene was performed before donning protective equipment and after removal when leaving the room.        HPI:  Chief Complaint: Fall  A complete HPI/ROS/PMH/PSH/SH/FH are unobtainable due to: Dementia    Context: Anatoliy Tinsley is a 86 y.o. female with a history of A. fib, dementia, anticoagulated on Eliquis who arrives to the ED via EMS from [place] [facility].  Per EMS report patient had a fall prior to arrival this morning.  Patient has obvious deformity to her nose.  Full review of symptoms and HPI limited due to patient's history of dementia.        PAST MEDICAL HISTORY  Active Ambulatory Problems     Diagnosis Date Noted   • Paroxysmal atrial fibrillation (HCC) 10/15/2020   • Carotid stenosis, asymptomatic, left 10/15/2020   • Mixed hyperlipidemia 10/15/2020   • Osteoporosis      Resolved Ambulatory Problems     Diagnosis Date Noted   • No Resolved Ambulatory Problems     Past Medical History:   Diagnosis Date   • Atrial fibrillation (HCC)    • Atrial fibrillation (HCC)    • Dementia (HCC)          PAST SURGICAL HISTORY  Past Surgical History:   Procedure Laterality Date   • CAROTID ENDARTERECTOMY     • HIP SURGERY     • TONSILLECTOMY           FAMILY HISTORY  Family History   Problem Relation Age of Onset   • Heart disease Mother    • Other Mother         low blood sugar   • Heart attack Mother    • Diabetes Father    • Heart attack Father    • Diabetes Brother    • Heart disease Brother    • Heart disease Brother    • Cancer Brother    • Breast cancer Neg Hx    • Colon cancer Neg Hx          SOCIAL HISTORY  Social  History     Socioeconomic History   • Marital status: Single   Tobacco Use   • Smoking status: Never   • Smokeless tobacco: Never   Vaping Use   • Vaping Use: Never used   Substance and Sexual Activity   • Alcohol use: Yes     Comment: may 2 times a year/caffeine use    • Drug use: Defer   • Sexual activity: Defer         ALLERGIES  Monosodium glutamate, Lipitor [atorvastatin calcium], Sugar-protein-starch, Atorvastatin, Brimonidine, Corn syrup, Diltiazem, Soy allergy, Tilactase, and Wheat bran        REVIEW OF SYSTEMS  Review of Systems     Limited due to history of dementia       PHYSICAL EXAM    ED Triage Vitals   Temp Heart Rate Resp BP SpO2   11/12/22 0652 11/12/22 0652 11/12/22 0652 11/12/22 0652 11/12/22 0652   97.8 °F (36.6 °C) 80 18 120/58 98 %       Physical Exam  Musculoskeletal:        Hands:       Comments: Patient has soft compartments to the right forearm, tenderness noted in the right thumb and medial right wrist.  2+ radial pulse on the right.  Patient able to make a fist and give a thumbs up with the right hand.       GENERAL: Well appearing, nontoxic appearing, not distressed, cervical collar in place  HENT: normocephalic, atraumatic  Obvious deformity noted to nose, dried blood noted, no active bleeding  EYES: no scleral icterus, PERRL,EOMI  CV: regular rhythm, regular rate, no murmur  RESPIRATORY: normal effort, CTAB  ABDOMEN: soft   MUSCULOSKELETAL: no deformity  No cervical, thoracic or lumbar vertebral tenderness to palpation  No step off or crepitus noted  5/5 muscle strength with dorsiflexion and flexion  2+ DP & PT pulses bilaterally    NEURO: alert, moves all extremities, follows commands, mental status normal/baseline  SKIN: warm, dry, no rash   Psych: Pleasantly confused  Nursing notes and vital signs reviewed      LAB RESULTS  No results found for this or any previous visit (from the past 24 hour(s)).    Ordered the above labs and independently reviewed the results.      RADIOLOGY  XR  Hand 3+ View Right, XR Wrist 3+ View Right    Result Date: 2022  THREE-VIEW RIGHT HAND AND THREE-VIEW RIGHT WRIST  HISTORY: Fell. Pain.  FINDINGS: There is diffuse osteoporosis. There are prominent degenerative changes at the base of thumb. No acute fracture is seen.  This report was finalized on 2022 9:22 AM by Dr. Pito Humphreys M.D.      CT Head Without Contrast, CT Cervical Spine Without Contrast, CT Facial Bones Without Contrast    Result Date: 2022  CT SCAN OF THE BRAIN WITHOUT CONTRAST AND CT SCAN OF THE FACIAL BONES WITH THIN AXIAL IMAGES AND CORONAL AND SAGITTAL RECONSTRUCTIONS  HISTORY: Fell with trauma to nasal region.  The CT scan was performed as an emergency procedure through the brain without contrast followed by additional thin section imaging through the facial bones with coronal and sagittal reconstructions. The following findings are present: 1. There is prominent diffuse atrophy and chronic small vessel ischemic change similar to the recent CT scan dated 2022. There is no evidence of intracranial hemorrhage or mass effect. 2. There is a somewhat comminuted fracture involving the left and right nasal bones with deviation to the left that is new since the previous exam. This is associated with fluid scattered in the anterior ethmoid air cells and right maxillary sinus. No other facial fractures are seen.  CT SCAN OF THE CERVICAL SPINE  HISTORY: Fell. Neck pain.  The CT scan was performed as an emergency procedure through the cervical spine and demonstrates the followin. There is no evidence of acute fracture with particular reference to the odontoid. The prevertebral soft tissues appear normal. 2. There are severe degenerative changes scattered throughout the cervical spine and similar to the recent CT scan of the cervical spine dated 10/27/2022. There is some bony foraminal encroachment, particularly at C2-3 on the right, C3-4 bilaterally, C4-5 bilaterally, and C5-6 on  the left.      Radiation dose reduction techniques were utilized, including automated exposure control and exposure modulation based on body size.  This report was finalized on 11/12/2022 8:28 AM by Dr. Pito Humphreys M.D.        I ordered the above noted radiological studies and viewed the images on the PACS system.       MEDICAL RECORD REVIEW  Medical records reviewed in epic, patient was last here October 27, 2022 after a fall at that time.  She had CT of the head and cervical spine during that visit both which showed no acute abnormalities.      PROCEDURES    Procedures        DIFFERENTIAL DIAGNOSIS  Differential Diagnosis for head injury include but are not limited to the following:  Cerebral contusion, Concussion ( with or without LOC), Head contusion, Skull fracture, orbital fracture, Hematoma, Intracranial Hemorrhage, Laceration, Post Concussion Syndrome.   Nasal bone fracture, facial contusions, facial fractures      PROGRESS, DATA ANALYSIS, CONSULTS, AND MEDICAL DECISION MAKING        ED Course as of 11/12/22 1448   Sat Nov 12, 2022   0840 Patient is a pleasantly confused 86-year-old who presents today after a fall at her memory care unit.  CTs of the head and facial bones have been ordered to rule out fracture.  Patient has no cervical spine tenderness however due to age, unknown mechanism of injury CT of the C-spine has been ordered to rule out fracture.  During exam patient did complain of right hand and right wrist pain, x-rays of been ordered to rule out fracture.  Wound care will be provided to her nose.  Plan is to discharge back to the memory care unit, her brother at bedside states that he would be able to transport her back. [MS]   0841 Reviewed pt's history and workup with Dr. Brown.  After a bedside evaluation, she agrees with the plan of care.     [MS]   0842  Discussed with Dr. Humphreys regarding the CT head, cervical spine and facial bone results which revealed bilateral nasal bone fracture  with deviation to the left noted.  Unremarkable CT of the head and C-spine.  See dictation for official radiology interpretation.     [MS]   0950 I viewed the patient's right hand and wrist imaging in PACS.  My interpretation is no fracture or dislocation.  See dictation for official radiology interpretation.     [MS]   1014 Patient and family updated on work-up done here today which showed nasal bone fracture, otherwise unremarkable work-up.  Patient will be given ENT follow-up, report will be called back to the facility by the RN with instructions for ice to her nose for 24 hours and Tylenol as needed for pain.  Strict return to ER precautions provided in the discharge instructions, patient has a closed head injury and is anticoagulated.  Patient is at her baseline per family at bedside. [MS]      ED Course User Index  [MS] Amee Gonzales APRN     Discussed plan for discharge, as there is no emergent indication for admission. Pt/family is agreeable and understands need for follow up and repeat testing.  Pt is aware that discharge does not mean that nothing is wrong but it indicates no emergency is present that requires admission and they must continue care with follow-up as given below or physician of their choice.   Patient/Family voiced understanding of above instructions.  Patient discharged in stable condition.    DIAGNOSIS  Final diagnoses:   Closed head injury, initial encounter   Closed fracture of nasal bone, initial encounter   Contusion of multiple sites of right hand and wrist, initial encounter   Fall, initial encounter       FOLLOW UP   Gildardo Crum APRN  [address] [facility] [address]  [facility] [address]  [phone]    Call in 2 days      Russel Prince MD  [address]  [facility] [address]  [phone]    Call in 2 days        RX     Medication List      No changes were made to your prescriptions during this visit.           MEDICATIONS GIVEN IN ED    Medications - No  data to display        COURSE & MEDICAL DECISION MAKING  Any/All labs and Any/All Imaging studies that were ordered were reviewed and are noted above.  Results were reviewed/discussed with the patient and they were also made aware of online access.    Pt also made aware that some labs, such as cultures, will not be resulted during ER visit and followup with PMD is necessary.        Amee Gonzales, APRN  11/12/22 1445

## 2022-11-12 NOTE — ED PROVIDER NOTES
The LEAH and I have discussed this patient's history, physical exam and treatment plan.  I provided a substantive portion of the care of this patient.  I have reviewed the documentation and personally had a face to face interaction with the patient and personally performed the physical exam, in its entirety.  I affirm the documentation and agree with the treatment and plan.  The following describes my personal findings.      The patient presents complaining of pain to her face and nosebleed after an unwitnessed fall at her nursing facility.  Patient does not remember the sequence of events leading to her fall in keeping with her baseline confusion.  Brother at bedside reports the patient seems appropriate.  Patient denies headache, neck or back pain, chest pain, shortness of air, abdominal pain.    Comprehensive Physical exam:  Patient is nontoxic appearing confused, hard of hearing awake, alert  HEENT: normocephalic, bruising, swelling and some deformity over nasal bridge, dried blood bilateral nares, oropharynx clear without blood  Neck: C-spine nontender to palpation, no goiter, no pain with ROM  Pulmonary: Nontachypneic, breath sounds heard well bilaterally, chest wall nontender  cardiovascular: Nontachycardic, bilateral radial and posterior tibial pulses palpable  Abdomen: Soft, nontender  musculoskeletal: Good range of motion, pulse, sensation x4, bruising over dorsum of right wrist and in the intertriginous area between first and second metacarpals on the right hand with tenderness to palpation without deformity.  Patient with distal cap refill intact,, mild tenderness left distal tibia  Neuro/psychiatric:calm, appropriate, cooperative  Skin:warm, dry    Patient with unwitnessed fall, neuro exam normal, no intracranial traumatic abnormalities, nasal fracture, epistaxis resolved.  Patient and family are aware patient is advised to follow with ENT specialist upon discharge.  If patient able to ambulate,  anticipate outpatient follow-up.    Patient was wearing facemask when I entered the room and throughout our encounter. Full protective equipment was worn throughout this patient encounter including a face mask, eye protection and gloves. Hand hygiene was performed before donning protective equipment and after removal when leaving the room.           Rody Brown MD  11/12/22 0801

## 2022-11-27 PROBLEM — L03.116 CELLULITIS OF LEFT LOWER EXTREMITY: Status: ACTIVE | Noted: 2022-01-01

## 2022-12-01 PROBLEM — R78.81 BACTEREMIA DUE TO STREPTOCOCCUS: Status: ACTIVE | Noted: 2022-01-01

## 2022-12-01 PROBLEM — B95.5 BACTEREMIA DUE TO STREPTOCOCCUS: Status: ACTIVE | Noted: 2022-01-01

## 2022-12-01 PROBLEM — R78.81 MSSA BACTEREMIA: Status: ACTIVE | Noted: 2022-01-01

## 2022-12-01 PROBLEM — B95.61 MSSA BACTEREMIA: Status: ACTIVE | Noted: 2022-01-01

## 2022-12-01 NOTE — PLAN OF CARE
Goal Outcome Evaluation:  Plan of Care Reviewed With: patient        Progress: no change  Outcome Evaluation: Pt alert but confused. Becomes very agitated with care and can fight against staff. Brother spoke with Dr. Lance last night. IV fluids and antibiotics as ordered. WIll monitor.

## 2022-12-01 NOTE — PROGRESS NOTES
LOS: 2 days     Chief Complaint:  MSSA bacteremia     Interval History:  Afebrile, remains confused.  Also hard of hearing.  Tolerating antibiotics without vomiting diarrhea or rash.  Breathing comfortably on room air    Vital Signs  Temp:  [97.3 °F (36.3 °C)-98.8 °F (37.1 °C)] 97.7 °F (36.5 °C)  Heart Rate:  [] 98  Resp:  [16] 16  BP: (127-152)/(66-99) 152/85    Physical Exam:  General: Frail and confused  Cardiovascular: Tachycardic, no lower extremity edema  Respiratory: Basilar crackles  GI: Soft, NT/ND, + bowel sounds bilaterally  Skin: No rashes   Extremities: Left lower shin with decreased erythema no purulence    Antibiotics:  Cefazolin 2 g IV every 8 hours     Results Review:    Lab Results   Component Value Date    WBC 9.60 12/01/2022    HGB 9.3 (L) 12/01/2022    HCT 29.2 (L) 12/01/2022    MCV 96.1 12/01/2022     12/01/2022     Lab Results   Component Value Date    GLUCOSE 89 12/01/2022    BUN 11 12/01/2022    CREATININE 0.58 12/01/2022    EGFRIFNONA 61 03/04/2021    EGFRIFAFRI 74 03/04/2021    BCR 19.0 12/01/2022    CO2 24.9 12/01/2022    CALCIUM 7.7 (L) 12/01/2022    PROTENTOTREF 6.6 06/15/2022    ALBUMIN 3.80 11/27/2022    LABIL2 1.9 06/15/2022    AST 31 11/27/2022    ALT 11 11/27/2022       Microbiology:  11/28 BCx NGTD x 2  11/27 BCx MSSA 1/2 set, group B strep 1/2, corynebacterium 1/2    11/30 TTE EF of 56 to 60%.  Moderate calcification of the aortic valve no evidence of vegetation.  Calcification of the mitral valve with mild MR.  Mild TR.  Pulmonic valve is not well visualized    Assessment & Plan   MSSA bacteremia  Group B strep bacteremia  Leukocytosis  Left lower extremity wounds  Right elbow olecranon process fracture  Possible right hip periprosthetic fracture  Dementia complicating above    Repeat blood cultures are negative to date.  Echocardiogram without overt evidence of endocarditis.  Given known source of her bacteremia  (left lower extremity cellulitis/wounds ), the fact  that she cleared her blood cultures quickly as well as a negative surface echocardiogram for endocarditis I do not believe a LAKEISHA is required at this time.  Palliative care has been consulted and is being considered.  Given all the above I would recommend treatment with cefazolin 2 g IV every 8 hours for a total of 14 days.  If the patient is not palliative/comfort care at that time I would recommend repeating blood cultures times two 1 week after her antibiotics have been stopped to ensure complete resolution of the bacteremia.      Final antibiotic recommendations  1.  Cefazolin 2 g IV every 8 hours x14 days.  Antibiotic stop date December 12    2.  Please monitor weekly CBC with differential and creatinine and fax the results to the infectious disease clinic at [phone]    3.  No ID follow-up needed    Certainly if the patient transitions to palliative/comfort care antibiotics can be stopped    ID will sign off.  Please do not hesitate to call us with further questions or concerns

## 2022-12-01 NOTE — PROGRESS NOTES
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 Brother at bedside. He stated he spoke to surgery yesterday. Brother is considering surgery for her elbow fracture but states his understanding is that she has to be clear of infections and does not need surgery for her hip. He has been having a difficult time committing to decision and remains uncertain on what to do despite feeling that patient's mentation will not improve with surgery and patient will not rehab well.     Attempted to clarify goals and next steps, however, brother wanting to speak with orthopedic surgeon again as well as other family prior to deciding on goals of care. He noted that he has information he needs from palliative team and he will contact us if he has any further questions. Palliative will be available as needed for ongoing [facility] discussions and support.

## 2022-12-01 NOTE — PROGRESS NOTES
[facility] Medicine Services  PROGRESS NOTE    Patient Name: Anatoliy Tinsley  : 1935  MRN: [id number]    Date of Admission: 2022  Primary Care Physician: Gildardo Crum APRN    Subjective   Subjective     CC:  Follow-up up bacteremia    Subjective:   Patient is able to tell me her name.  She is otherwise a very poor historian.  She is reasonably calm during my evaluation.  She denies any specific complaints.  She reports she has been eating intermittently.    Review of Systems  No current fevers or chills  No current shortness of breath or cough  No current nausea, vomiting, or diarrhea  No current chest pain or palpitations       Objective   Objective     Vital Signs:   Temp:  [97.3 °F (36.3 °C)-98.8 °F (37.1 °C)] 97.7 °F (36.5 °C)  Heart Rate:  [] 98  Resp:  [16] 16  BP: (127-152)/(66-99) 152/85        Physical Exam:  Constitutional:Awake, alert, elderly and chronically ill-appearing  HENT: NCAT, mucous membranes moist, neck supple  Respiratory: No cough or wheezes, respiratory effort normal, nonlabored breathing   Cardiovascular:  normal radial pulses, rate regular  Gastrointestinal: Positive bowel sounds, soft, nontender, nondistended  Musculoskeletal: Elderly frail and chronically debilitated in appearance, pitting bilateral lower extremity edema  Psychiatric: Poor historian, confused, somewhat cooperative, conversational  Neurologic: No slurred speech or facial droop, follows commands  Skin: Left leg erythema and skin changes, no rashes or jaundice, warm      Results Reviewed:  Results from last 7 days   Lab Units 22  0515 22  1248   WBC 10*3/mm3 9.60 10.39 10.08 13.64*   HEMOGLOBIN g/dL 9.3* 9.0* 9.7* 10.9*   HEMATOCRIT % 29.2* 27.6* 29.0* 34.2   PLATELETS 10*3/mm3 261 237 215 267   PROCALCITONIN ng/mL  --   --   --  0.13     Results from last 7 days   Lab Units 22  0722  0730 22  0515 22  1248   SODIUM mmol/L 140  141 138 139   POTASSIUM mmol/L 4.1 3.5 4.0 4.5   CHLORIDE mmol/L 107 107 105 101   CO2 mmol/L 24.9 23.0 24.3 28.3   BUN mg/dL 11 11 14 18   CREATININE mg/dL 0.58 0.74 0.71 0.94   GLUCOSE mg/dL 89 90 81 123*   CALCIUM mg/dL 7.7* 7.7* 8.0* 9.1   ALT (SGPT) U/L  --   --   --  11   AST (SGOT) U/L  --   --   --  31     Estimated Creatinine Clearance: 63.5 mL/min (by C-G formula based on SCr of 0.58 mg/dL).    Microbiology Results Abnormal     Procedure Component Value - Date/Time    Blood Culture - Blood, Hand, Right [[id number]]  (Normal) Collected: 11/28/22 1542    Lab Status: Preliminary result Specimen: Blood from Hand, Right Updated: 11/30/22 1615     Blood Culture No growth at 2 days    Blood Culture - Blood, Hand, Left [[id number]]  (Normal) Collected: 11/28/22 1547    Lab Status: Preliminary result Specimen: Blood from Hand, Left Updated: 11/30/22 1615     Blood Culture No growth at 2 days          Imaging Results (Last 24 Hours)     ** No results found for the last 24 hours. **          Results for orders placed during the hospital encounter of 11/27/22    Adult Transthoracic Echo Complete W/ Cont if Necessary Per Protocol    Interpretation Summary  •  Left ventricular systolic function is normal. Left ventricular ejection fraction appears to be 56 - 60%.  •  Left ventricular diastolic function was normal.  •  There is moderate calcification of the aortic valve mainly affecting the non-coronary cusp(s).  There is calcification at the tip of the anterior mitral valve leaflet.  These findings do not meet criteria for vegetation.  There is no other finding consistent with intracardiac infectious process.  If clinical suspicion remains high consider LAKEISHA for clarification.  •  Estimated right ventricular systolic pressure from tricuspid regurgitation is mildly elevated (35-45 mmHg).      I have reviewed the medications:  Scheduled Meds:calcium carbonate (oyster shell), 500 mg, Oral, Daily  ceFAZolin, 2 g, Intravenous,  Q8H  magnesium oxide, 400 mg, Oral, Daily  metoprolol succinate XL, 25 mg, Oral, Daily      Continuous Infusions:   PRN Meds:.•  acetaminophen **OR** acetaminophen **OR** acetaminophen  •  ondansetron  •  [COMPLETED] Insert Peripheral IV **AND** sodium chloride  •  [COMPLETED] Insert Peripheral IV **AND** sodium chloride    Assessment & Plan   Assessment & Plan     Active Hospital Problems    Diagnosis  POA   • **Cellulitis of left lower extremity [L03.116]  Yes   • Bacteremia due to Streptococcus [R78.81, B95.5]  Yes   • MSSA bacteremia [R78.81, B95.61]  Yes   • Osteoporosis [M81.0]  Yes   • Paroxysmal atrial fibrillation (HCC) [I48.0]  Yes   • Mixed hyperlipidemia [E78.2]  Yes      Resolved Hospital Problems   No resolved problems to display.        Brief Hospital Course to date:  Anatoliy Tinsley is a 86 y.o. female presents with cellulitis and bacteremia.    MSSA bacteremia  Streptococcus bacteremia  Left leg cellulitis and wound  Paroxysmal atrial fibrillation  Right elbow olecranon process fracture  Right femur fracture  Dementia with metabolic encephalopathy due to infection  Mechanical fall  Anemia    Discussion/plan:  Currently receiving broad-spectrum antibiotic treatment.  Supportive care and symptomatic treatment.  Fall prevention.  Continue metoprolol for now.  Iron studies most consistent with anemia of chronic disease however may also have some aspect of iron deficiency anemia.  No bleeding.  Likely partially dilution related.  Significant peripheral edema noted.  Appears hypervolemic.  Stop IV fluid and monitor oral intake.  Prognosis thought guarded to poor based on significant comorbid conditions and advanced age with decline in the setting of dementia.  Appreciate palliative care conversations with the family.    DVT Prophylaxis: Mechanical      Disposition: Pending    CODE STATUS:   Code Status and Medical Interventions:   Ordered at: 11/29/22 1240     Medical Intervention Limits:    NO intubation  (DNI)    NO cardioversion     Level Of Support Discussed With:    Next of Kin (If No Surrogate)     Code Status (Patient has no pulse and is not breathing):    No CPR (Do Not Attempt to Resuscitate)     Medical Interventions (Patient has pulse or is breathing):    Limited Support     Release to patient:    Routine Release       Yomi Rod MD  12/01/22

## 2022-12-01 NOTE — CONSULTS
Orthopedic Consult      Patient: Anatoliy Tinsley  YOB: 1935     Date of Admission: 11/27/2022 11:26 AM    Medical Record Number: [id number]    Attending Physician: Louis Velasco MD    Consulting Physician: Jocelyn Lance MD    Reason for Consult: 1.  Right proximal femur periprosthetic femur fracture with a total hip replacement  2.  Right elbow olecranon fracture    History of Present Illness: 86 y.o. female admitted to [facility] with Long term (current) use of anticoagulants [Z79.01]  Cellulitis of left lower extremity [L03.116]  Acute right hip pain [M25.551]  Eyebrow laceration, left, initial encounter [S01.112A]  Closed olecranon fracture, right, initial encounter [S52.021A]  Anemia, unspecified type [D64.9].     The patient was evaluated in the emergency room and was diagnosed with a right hip and right elbow fracture.   Secondary to the age and multiple medical comorbidities the patient was admitted to the hospitalist.   I was consulted for further evaluation and treatment.   The patient was in the usual state of health and fell from standing height in her assisted living, Resulting in sudden onset right hip pain and inability to ambulate.   Denies any history of loss of consciousness, headache, vomiting, or seizures.   Denies any other injuries.   The patient is accompanied by her brother family members  to this hospital visit.     Patient is a  Home ambulator. Patient uses walker/cane assistive device.   The patient  lives at assisted living , is very dependent in activities of daily living.  The patient has a history of dementia.    Patient denies any history of: DVT/PE, MRSA, COPD, CHF, CAD, Diabetes mellitus,   The patient has history of : Atrial fibrillation, dementia  The patient is on anticoagulants: Eliquis      Past medical history, Past surgical history, family history, Social history, current medications, home medications Have been reviewed by me.    Past  Medical History:   Diagnosis Date   • Atrial fibrillation (HCC)    • Atrial fibrillation (HCC)    • Dementia (HCC)    • Osteoporosis      Past Surgical History:   Procedure Laterality Date   • CAROTID ENDARTERECTOMY     • HIP SURGERY     • TONSILLECTOMY       Social History     Occupational History   • Not on file   Tobacco Use   • Smoking status: Never   • Smokeless tobacco: Never   Vaping Use   • Vaping Use: Never used   Substance and Sexual Activity   • Alcohol use: Yes     Comment: may 2 times a year/caffeine use    • Drug use: Defer   • Sexual activity: Defer      Social History     Social History Narrative   • Not on file     Family History   Problem Relation Age of Onset   • Heart disease Mother    • Other Mother         low blood sugar   • Heart attack Mother    • Diabetes Father    • Heart attack Father    • Diabetes Brother    • Heart disease Brother    • Heart disease Brother    • Cancer Brother    • Breast cancer Neg Hx    • Colon cancer Neg Hx           Allergies   Allergen Reactions   • Monosodium Glutamate Shortness Of Breath     Difficulty breathing, swallowing, effect her logic,    • Lipitor [Atorvastatin Calcium] Itching   • Sugar-Protein-Starch Dizziness     sensitive   • Atorvastatin Other (See Comments)     myalgias   • Brimonidine Other (See Comments)     Altered sensorium with disorientation (CNS depression)   • Corn Syrup Itching   • Diltiazem Dizziness     Lightheadedness   • Soy Allergy GI Intolerance     Nausea weak    • Tilactase GI Intolerance   • Wheat Bran GI Intolerance        Home Medications:  Medications Prior to Admission   Medication Sig Dispense Refill Last Dose   • alendronate (Fosamax) 70 MG tablet Take 1 tablet by mouth Every 7 (Seven) Days. 4 tablet 11 11/26/2022   • apixaban (Eliquis) 2.5 MG tablet tablet Take 1 tablet by mouth Every 12 (Twelve) Hours. 180 tablet 4 11/26/2022   • calcium carbonate-vitamin d (Calcium 600+D) 600-400 MG-UNIT per tablet Take 1 tablet by mouth  "Daily.   11/26/2022   • ezetimibe (ZETIA) 10 MG tablet TAKE ONE TABLET BY MOUTH DAILY 90 tablet 1 11/26/2022   • MAGNESIUM PO Take  by mouth.   11/26/2022   • metoprolol succinate XL (TOPROL-XL) 25 MG 24 hr tablet Take 1 tablet by mouth Daily. 30 tablet 11 11/26/2022       Current Medications:  Scheduled Meds:calcium carbonate (oyster shell), 500 mg, Oral, Daily  ceFAZolin, 2 g, Intravenous, Q8H  magnesium oxide, 400 mg, Oral, Daily  metoprolol succinate XL, 25 mg, Oral, Daily      Continuous Infusions:sodium chloride, 75 mL/hr, Last Rate: 75 mL/hr (11/30/22 0956)      PRN Meds:.•  acetaminophen **OR** acetaminophen **OR** acetaminophen  •  ondansetron  •  [COMPLETED] Insert Peripheral IV **AND** sodium chloride  •  [COMPLETED] Insert Peripheral IV **AND** sodium chloride    Review of Systems:   A 12 point system review was reviewed with the patient and from the chart  and is negative except as mentioned in history of present illness.      Physical Exam: 86 y.o. female                    Vitals:    11/29/22 2109 11/30/22 0513 11/30/22 1016 11/30/22 1714   BP: 109/70 120/68 127/66    BP Location: Left arm Right arm Left arm    Patient Position: Sitting Lying Lying    Pulse: 101 108 102    Resp: 16 16 16    Temp: 98.4 °F (36.9 °C) 99.9 °F (37.7 °C) 98.8 °F (37.1 °C)    TempSrc: Oral Oral Oral    SpO2: 93% 92% 94%    Weight:    63 kg (139 lb)   Height:    160 cm (63\")        Gait: Not evaluated.     Mental/HEENT/cardio/skin: The patient's general appearance was well-nourished, well-hydrated, no acute distress.  Orientation was alert and oriented ×3.  The patient's mood was normal.   Pulmonary exam shows normal late exchange, no labored breathing, or shortness of breath.    The  skin exam showed normal temperature and color in the area of examination.    Extremities: Right elbow positive swelling, positive tenderness, attempted movements of the elbow are painful and restricted, good finger movements    Right hip " positive tenderness around the greater trochanter.  Able to do gentle ankle range of motion.    Pulses:  Pulses palpable and equal bilaterally    Diagnostic Tests:    Results from last 7 days   Lab Units 11/30/22  0730 11/28/22  0515 11/27/22  1248   WBC 10*3/mm3 10.39 10.08 13.64*   HEMOGLOBIN g/dL 9.0* 9.7* 10.9*   HEMATOCRIT % 27.6* 29.0* 34.2   PLATELETS 10*3/mm3 237 215 267     Results from last 7 days   Lab Units 11/30/22  0730 11/28/22  0515 11/27/22  1248   SODIUM mmol/L 141 138 139   POTASSIUM mmol/L 3.5 4.0 4.5   CHLORIDE mmol/L 107 105 101   CO2 mmol/L 23.0 24.3 28.3   BUN mg/dL 11 14 18   CREATININE mg/dL 0.74 0.71 0.94   GLUCOSE mg/dL 90 81 123*   CALCIUM mg/dL 7.7* 8.0* 9.1         No results found for: URICACID  No results found for: CRYSTAL  Microbiology Results (last 10 days)     Procedure Component Value - Date/Time    Blood Culture - Blood, Hand, Left [[id number]]  (Normal) Collected: 11/28/22 1547    Lab Status: Preliminary result Specimen: Blood from Hand, Left Updated: 11/30/22 1615     Blood Culture No growth at 2 days    Blood Culture - Blood, Hand, Right [[id number]]  (Normal) Collected: 11/28/22 1542    Lab Status: Preliminary result Specimen: Blood from Hand, Right Updated: 11/30/22 1615     Blood Culture No growth at 2 days    Blood Culture - Blood, Arm, Left [[id number]]  (Abnormal) Collected: 11/27/22 1433    Lab Status: Final result Specimen: Blood from Arm, Left Updated: 11/30/22 0619     Blood Culture Corynebacterium species     Isolated from Aerobic Bottle     Gram Stain Aerobic Bottle Gram positive bacilli    Narrative:      Probable contaminant requires clinical correlation, susceptibility not performed unless requested by physician.      Blood culture does not meet the specified criteria for PCR identification.  If pregnant, immunocompromised, or clinical concern for meningitis, call lab to run BCID for Listeria monocytogenes.    Blood Culture - Blood, Arm, Left [[id number]]   (Abnormal)  (Susceptibility) Collected: 11/27/22 1405    Lab Status: Preliminary result Specimen: Blood from Arm, Left Updated: 11/30/22 0551     Blood Culture Streptococcus agalactiae (Group B)     Isolated from Aerobic Bottle     Blood Culture Staphylococcus aureus     Comment:   Infectious disease consultation is highly recommended to rule out distant foci of infection.        Isolated from Aerobic Bottle     Gram Stain Aerobic Bottle Gram positive cocci in chains    Susceptibility      Streptococcus agalactiae (Group B)      TONE      Ceftriaxone Susceptible      Levofloxacin Susceptible      Penicillin G Susceptible      Vancomycin Susceptible                           Blood Culture ID, PCR - Blood, Arm, Left [[id number]]  (Abnormal) Collected: 11/27/22 1405    Lab Status: Final result Specimen: Blood from Arm, Left Updated: 11/28/22 0734     BCID, PCR Staph aureus. mecA/C and MREJ (methicillin resistance gene) NOT detected. Identification by BCID2 PCR     BCID, PCR 2 Streptococcus agalactiae (Group B). Identification by BCID2 PCR.    Narrative:      Infectious disease consultation is highly recommended to rule out distant foci of infection.        XR Elbow 2 View Right    Result Date: 11/27/2022  Fracture of the olecranon process of the ulna  This report was finalized on 11/27/2022 11:08 AM by Dr. Geoffrey Mclaughlin M.D.      CT Head Without Contrast    Result Date: 11/27/2022  No acute intracranial abnormality  Radiation dose reduction techniques were utilized, including automated exposure control and exposure modulation based on body size.  This report was finalized on 11/27/2022 11:01 AM by Dr. Geoffrey Mclaughlin M.D.      CT Lower Extremity Right Without Contrast    Result Date: 11/29/2022  Acute, nondisplaced, periprosthetic proximal right femur fracture.  This report was finalized on 11/29/2022 1:03 PM by Dr. Agus Umanzor M.D.      XR Hip With or Without Pelvis 2 - 3 View Right    Result Date:  11/27/2022  Lucency through the medial cortex of the proximal right femur adjacent to the femoral stem of right hip prosthesis suspicious for possible periprosthetic fracture. A CT could be performed to better evaluate  This report was finalized on 11/27/2022 12:36 PM by Dr. Geoffrey Mclaughlin M.D.        The labs, X-ray results for preoperative evaluation have been reviewed by me.    Assessment: 1.  Right proximal femur periprosthetic femur fracture with a total hip replacement  2.  Right elbow olecranon fracture    Patient Active Problem List   Diagnosis   • Paroxysmal atrial fibrillation (HCC)   • Carotid stenosis, asymptomatic, left   • Mixed hyperlipidemia   • Osteoporosis   • Cellulitis of left lower extremity       Plan:    Options and alternatives were discussed in detail with the patient and   family.   The patient is indicated for resolution of the sepsis.   She has a positive blood cultures with staphylococcus and streptococcus.     She has severe dementia and she will be a candidate for nonoperative management of both her femur periprosthetic fracture and also her right elbow olecranon fracture.  Her brother was at the bedside and he was responsible for most of the history.  According to him she was able to ambulate until recently.  Given her sepsis orthopedic intervention is not ideal and her limitation from the right olecranon fracture will be loss of triceps strength/pushoff.  We can always revisit this if she release around from her infection.  Also plans are on for considering palliative care.  I think this is reasonable.  Further decisions will be made as dependent on her clinical course.    Date: 11/30/2022    Jocelyn Lance MD     CC: Gildardo Crum APRN; MD Rod Todd, Louis Scott MD

## 2022-12-02 PROBLEM — R62.7 FAILURE TO THRIVE IN ADULT: Status: ACTIVE | Noted: 2022-01-01

## 2022-12-02 NOTE — PLAN OF CARE
Goal Outcome Evaluation:  Plan of Care Reviewed With: patient        Progress: no change  Outcome Evaluation: Patient confused, agitated with care tasks but quiet when left alone. No objective signs of pain or distress. Patient slept on and off overnight, will continue to monitor.

## 2022-12-02 NOTE — PROGRESS NOTES
[facility] Medicine Services  PROGRESS NOTE    Patient Name: Anatoliy Tinsley  : 1935  MRN: [id number]    Date of Admission: 2022  Primary Care Physician: Gildardo Crum APRN    Subjective   Subjective     CC:  Follow-up up bacteremia    Subjective:   Patient can state her name only.  She is a very poor historian.  She is resting calmly in bed.  No family is at the bedside during my evaluation     Review of Systems  No current fevers or chills  No current shortness of breath or cough  No current nausea, vomiting, or diarrhea  No current chest pain or palpitations       Objective   Objective     Vital Signs:   Temp:  [97.9 °F (36.6 °C)-98 °F (36.7 °C)] 98 °F (36.7 °C)  Heart Rate:  [101-106] 101  Resp:  [16-18] 16  BP: (135-165)/(81-87) 148/87        Physical Exam:  Constitutional:Awake, alert, elderly and chronically ill-appearing  HENT: NCAT, mucous membranes moist, neck supple  Respiratory: No cough or wheezes, respiratory effort normal, nonlabored breathing   Cardiovascular:  normal radial pulses, rate regular  Gastrointestinal: Positive bowel sounds, soft, nontender, nondistended  Musculoskeletal: Elderly frail and chronically debilitated in appearance, pitting bilateral lower extremity edema  Psychiatric: Poor historian, confused, somewhat cooperative, conversational  Neurologic: No slurred speech or facial droop, follows commands  Skin: Left leg erythema and chronic skin changes, no rashes or jaundice, warm      Results Reviewed:  Results from last 7 days   Lab Units 22  0845 22  0515 22  1248   WBC 10*3/mm3 9.11 9.60 10.39   < > 13.64*   HEMOGLOBIN g/dL 10.3* 9.3* 9.0*   < > 10.9*   HEMATOCRIT % 31.2* 29.2* 27.6*   < > 34.2   PLATELETS 10*3/mm3 361 261 237   < > 267   PROCALCITONIN ng/mL  --   --   --   --  0.13    < > = values in this interval not displayed.     Results from last 7 days   Lab Units 22  0845 22  0730  11/28/22  0515 11/27/22  1248   SODIUM mmol/L 143 140 141   < > 139   POTASSIUM mmol/L 4.4 4.1 3.5   < > 4.5   CHLORIDE mmol/L 105 107 107   < > 101   CO2 mmol/L 27.1 24.9 23.0   < > 28.3   BUN mg/dL 10 11 11   < > 18   CREATININE mg/dL 0.64 0.58 0.74   < > 0.94   GLUCOSE mg/dL 93 89 90   < > 123*   CALCIUM mg/dL 8.6 7.7* 7.7*   < > 9.1   ALT (SGPT) U/L  --   --   --   --  11   AST (SGOT) U/L  --   --   --   --  31    < > = values in this interval not displayed.     Estimated Creatinine Clearance: 57.5 mL/min (by C-G formula based on SCr of 0.64 mg/dL).    Microbiology Results Abnormal     Procedure Component Value - Date/Time    Blood Culture - Blood, Hand, Right [[id number]]  (Normal) Collected: 11/28/22 1542    Lab Status: Preliminary result Specimen: Blood from Hand, Right Updated: 12/01/22 1615     Blood Culture No growth at 3 days    Blood Culture - Blood, Hand, Left [[id number]]  (Normal) Collected: 11/28/22 1547    Lab Status: Preliminary result Specimen: Blood from Hand, Left Updated: 12/01/22 1615     Blood Culture No growth at 3 days          Imaging Results (Last 24 Hours)     ** No results found for the last 24 hours. **          Results for orders placed during the hospital encounter of 11/27/22    Adult Transthoracic Echo Complete W/ Cont if Necessary Per Protocol    Interpretation Summary  •  Left ventricular systolic function is normal. Left ventricular ejection fraction appears to be 56 - 60%.  •  Left ventricular diastolic function was normal.  •  There is moderate calcification of the aortic valve mainly affecting the non-coronary cusp(s).  There is calcification at the tip of the anterior mitral valve leaflet.  These findings do not meet criteria for vegetation.  There is no other finding consistent with intracardiac infectious process.  If clinical suspicion remains high consider LAKEISHA for clarification.  •  Estimated right ventricular systolic pressure from tricuspid regurgitation is mildly  elevated (35-45 mmHg).      I have reviewed the medications:  Scheduled Meds:calcium carbonate (oyster shell), 500 mg, Oral, Daily  ceFAZolin, 2 g, Intravenous, Q8H  magnesium oxide, 400 mg, Oral, Daily  metoprolol succinate XL, 25 mg, Oral, Daily      Continuous Infusions:   PRN Meds:.•  acetaminophen **OR** acetaminophen **OR** acetaminophen  •  ondansetron  •  [COMPLETED] Insert Peripheral IV **AND** sodium chloride  •  [COMPLETED] Insert Peripheral IV **AND** sodium chloride    Assessment & Plan   Assessment & Plan     Active Hospital Problems    Diagnosis  POA   • **Cellulitis of left lower extremity [L03.116]  Yes   • Bacteremia due to Streptococcus [R78.81, B95.5]  Yes   • MSSA bacteremia [R78.81, B95.61]  Yes   • Osteoporosis [M81.0]  Yes   • Paroxysmal atrial fibrillation (HCC) [I48.0]  Yes   • Mixed hyperlipidemia [E78.2]  Yes      Resolved Hospital Problems   No resolved problems to display.        Brief Hospital Course to date:  Anatoliy Tinsley is a 86 y.o. female presents with cellulitis and bacteremia.    MSSA bacteremia  Streptococcus bacteremia  Left leg cellulitis and wound  Paroxysmal atrial fibrillation  Right elbow olecranon process fracture  Right femur fracture  Dementia with metabolic encephalopathy due to infection  Mechanical fall  Anemia    Discussion/plan:  -Currently receiving broad-spectrum antibiotic treatment per infectious disease.     - Supportive care and symptomatic treatment.  Fall prevention.      -Continue metoprolol for now.      -Iron studies most consistent with anemia of chronic disease however may also have some aspect of iron deficiency anemia.  No bleeding.  Likely partially dilution related.  Hemoglobin improved today    -Significant peripheral edema noted.  Appears hypervolemic.  I have discontinued IV fluid and monitor oral intake.  Currently breathing on room air, hold off on diuresis at this point based on risk versus benefit assessment.    -Symptomatic treatment for  now with olecranon process fracture while family decide goals of care.  Orthopedic surgery reportedly has evaluated and discussed some with family per reports.    -Clinically patient appears very low functioning and has failure to thrive.  Prognosis thought guarded to poor based on significant comorbid conditions and advanced age with decline in the setting of dementia.  Appreciate palliative care conversations with the family.  Plan to follow-up on further decisions with goals of care.    DVT Prophylaxis: Mechanical      Disposition: Pending    CODE STATUS:   Code Status and Medical Interventions:   Ordered at: 11/29/22 1240     Medical Intervention Limits:    NO intubation (DNI)    NO cardioversion     Level Of Support Discussed With:    Next of Kin (If No Surrogate)     Code Status (Patient has no pulse and is not breathing):    No CPR (Do Not Attempt to Resuscitate)     Medical Interventions (Patient has pulse or is breathing):    Limited Support     Release to patient:    Routine Release       Yomi Rod MD  12/02/22

## 2022-12-02 NOTE — PLAN OF CARE
Goal Outcome Evaluation:  Plan of Care Reviewed With: patient        Progress: no change  Outcome Evaluation: Pt confused-- agitation at times. Q2 turn. No BM. F/C intact--bath given. Encouraged to eat--brother at bedside and updated on care. WIll cont to monitor

## 2022-12-03 NOTE — PLAN OF CARE
Goal Outcome Evaluation:              Outcome Evaluation: Patient confused and sometimes antagonistic upon approach.  Content watching TV much of shift and calms with brother, Edgar, at bedside.  Tylenol x1 this shift for elbow and femur fx pain with repositioning.  VSS.

## 2022-12-03 NOTE — PLAN OF CARE
Goal Outcome Evaluation:  Plan of Care Reviewed With: patient        Progress: no change  Outcome Evaluation: Patient confused, agitated with care tasks. Patient uncooperative at times, refusing to turn, refusing vital signs at times, yelling at staff. Slept most of night, no objecti s/s of pain. Split to right arm intact, IV ABX given as ordered. VSS, will continue to monitor.

## 2022-12-03 NOTE — PROGRESS NOTES
"    DAILY PROGRESS NOTE  [facility]    Patient Identification:  Name: Anatoliy Tinsley  Age: 86 y.o.  Sex: female  :  1935  MRN: [id number]         Primary Care Physician: Gildardo Crum APRN    Subjective:  Interval History: Patient selectively mute and is refusing to interact or talk with me at all.  She allows me to at least examine her but withdrew lower extremities when trying to examine    Objective:    Scheduled Meds:calcium carbonate (oyster shell), 500 mg, Oral, Daily  ceFAZolin, 2 g, Intravenous, Q8H  magnesium oxide, 400 mg, Oral, Daily  metoprolol succinate XL, 25 mg, Oral, Daily      Continuous Infusions:     Vital signs in last 24 hours:  Temp:  [96.2 °F (35.7 °C)-99 °F (37.2 °C)] 99 °F (37.2 °C)  Heart Rate:  [] 103  Resp:  [16-20] 20  BP: (100-139)/(60-85) 100/60    Intake/Output:    Intake/Output Summary (Last 24 hours) at 12/3/2022 1404  Last data filed at 12/3/2022 0636  Gross per 24 hour   Intake 120 ml   Output 650 ml   Net -530 ml       Exam:  /60 (BP Location: Left arm, Patient Position: Lying)   Pulse 103   Temp 99 °F (37.2 °C) (Oral)   Resp 20   Ht 160 cm (63\")   Wt 63 kg (139 lb)   SpO2 94%   BMI 24.62 kg/m²     General Appearance:    Alert, mean look, unable to scale mentation                           Eyes:    PERRL, left eyebrow suture noted/good hemostasis, EOM's intact, both eyes                         Throat:   Oral mucosa pink and moist                           Neck:   No JVD                         Lungs:    Decreased to auscultation bilaterally, respirations unlabored                 Chest Wall:    No tenderness or deformity                          Heart:    Regular/tachycardic rate and rhythm, S1 and S2 normal                  Abdomen:     Soft, nontender, bowel sounds active                  Extremities:   Resolving left lower extremity cellulitis, desquamation                        Pulses:   Pulses palpable in lower extremities         "                       Data Review:  Labs in chart were reviewed.    Assessment:  Active Hospital Problems    Diagnosis  POA   • **Cellulitis of left lower extremity [L03.116]  Yes   • Failure to thrive in adult [R62.7]  Yes   • Bacteremia due to Streptococcus [R78.81, B95.5]  Yes   • MSSA bacteremia [R78.81, B95.61]  Yes   • Osteoporosis [M81.0]  Yes   • Paroxysmal atrial fibrillation (HCC) [I48.0]  Yes   • Mixed hyperlipidemia [E78.2]  Yes      Resolved Hospital Problems   No resolved problems to display.       Plan:    Left lower extremity cellulitis with subsequent MSSA bacteremia   -Cefazolin per ID    Awaiting further orthopedic consultation    Pronounced dementia with behavior disorder    DIDIER -hold on iron supplementation for now        Disposition -no family currently at bedside.  I will try to reach out to the brother, who is the POA per discussion with managing RN, tomorrow as we await orthopedic input   -Tentative plans were for palliation though I also understand the brother is considering surgical intervention so after we get Ortho input, I will be happy to further clarify things with the POA as well    Ángel Cortez MD  12/3/2022  14:04 EST

## 2022-12-04 NOTE — PLAN OF CARE
Goal Outcome Evaluation:  Plan of Care Reviewed With: patient        Progress: no change  Outcome Evaluation: Patient remains confused. Becomes irritated with staff's attempts to obtain vital signs and when staff repositions patient. Patient had one episode of being cooperative with repositoning and vital signs. Compliant with administration of po tylenol for complaints of hip and shoulder pain.

## 2022-12-04 NOTE — NURSING NOTE
Discussed urinary catheter with Dr. Cortez. He would like to keep it in place at this time. States patient is not mobile enough to remove it currently. He did not want to order a urology consult. Will continue to follow.

## 2022-12-04 NOTE — PROGRESS NOTES
"    DAILY PROGRESS NOTE  [facility]    Patient Identification:  Name: Anatoliy Tinsley  Age: 86 y.o.  Sex: female  :  1935  MRN: [id number]         Primary Care Physician: Gildardo Crum APRN    Subjective:  Interval History: Resting comfortably upon entering room.  Immediately when she sees me she looks at me with a furrowed brow.  She is very paranoid and demented.  She barely allows me to examine her much with auscultation    Objective:    Scheduled Meds:calcium carbonate (oyster shell), 500 mg, Oral, Daily  ceFAZolin, 2 g, Intravenous, Q8H  magnesium oxide, 400 mg, Oral, Daily  metoprolol succinate XL, 25 mg, Oral, Daily      Continuous Infusions:     Vital signs in last 24 hours:  Temp:  [96 °F (35.6 °C)-99 °F (37.2 °C)] 96 °F (35.6 °C)  Heart Rate:  [] 78  Resp:  [16-22] 20  BP: (100-158)/(60-85) 146/81    Intake/Output:    Intake/Output Summary (Last 24 hours) at 2022 0936  Last data filed at 2022 0416  Gross per 24 hour   Intake 135 ml   Output 1250 ml   Net -1115 ml       Exam:  /81 (BP Location: Left arm, Patient Position: Lying)   Pulse 78   Temp 96 °F (35.6 °C) (Oral)   Resp 20   Ht 160 cm (63\")   Wt 63 kg (139 lb)   SpO2 95%   BMI 24.62 kg/m²     General Appearance:  Alert with advanced dementia and subsequent paranoia/agitation which limits my ability to fully examine the patient                         Throat:   Oral mucosa pink and moist                         Lungs:    Clear to auscultation bilaterally, respirations unlabored                 Chest Wall:    No tenderness or deformity                          Heart:    Regular rate and rhythm, S1 and S2 normal                  Abdomen:     Soft, nontender, bowel sounds active                 Extremities:   Right upper extremity and Ace                          Data Review:  Labs in chart were reviewed.    Assessment:  Active Hospital Problems    Diagnosis  POA   • **Cellulitis of left lower extremity " [L03.116]  Yes   • Failure to thrive in adult [R62.7]  Yes   • Bacteremia due to Streptococcus [R78.81, B95.5]  Yes   • MSSA bacteremia [R78.81, B95.61]  Yes   • Osteoporosis [M81.0]  Yes   • Paroxysmal atrial fibrillation (HCC) [I48.0]  Yes   • Mixed hyperlipidemia [E78.2]  Yes      Resolved Hospital Problems   No resolved problems to display.       Plan:    Left lower extremity cellulitis with subsequent MSSA bacteremia              -Cefazolin per ID     Awaiting further orthopedic consultation     Pronounced dementia with behavior disorder     DIDIER -hold on iron supplementation for now           Disposition -no family currently at bedside.  I will try to reach out to the brother, who is the POA per discussion with managing RN, tomorrow as we await orthopedic input              -Tentative plans were for palliation though I also understand the brother is considering surgical intervention so after we get Ortho input, I will be happy to further clarify things with the POA as well       Ángel Cortez MD  12/4/2022  09:36 EST

## 2022-12-05 NOTE — PROGRESS NOTES
Discharge Planning Assessment  [facility]     Patient Name: Anatoliy Tinsley  MRN: [id number]  Today's Date: 12/5/2022    Admit Date: 11/27/2022    Plan: Palliative pending   Discharge Needs Assessment    No documentation.                Discharge Plan     Row Name 12/05/22 1403       Plan    Plan Comments Spoke with the patient's brother about palliative and Goals of care. The patient's brother just wants her palliative with comfort measures. I explained to him that I will assist with any discharge needs that may arise and continue to follow daily. JOSELIN Danielson RN CCP.              Continued Care and Services - Admitted Since 11/27/2022    Coordination has not been started for this encounter.     Selected Continued Care - Episodes Includes continued care and service providers with selected services from the active episodes listed below    High Risk Care Management Episode start date: 11/7/2022   There are no active outsourced providers for this episode.               Expected Discharge Date and Time     Expected Discharge Date Expected Discharge Time    Dec 2, 2022          Demographic Summary    No documentation.                Functional Status    No documentation.                Psychosocial    No documentation.                Abuse/Neglect    No documentation.                Legal    No documentation.                Substance Abuse    No documentation.                Patient Forms    No documentation.                   Brii Danielson RN

## 2022-12-05 NOTE — PLAN OF CARE
Goal Outcome Evaluation:  Pt confused and irritated with vital signs and turns. VSS. IV ABX administered per orders. No complaints of pain overnight. Care ongoing.

## 2022-12-05 NOTE — PROGRESS NOTES
"    DAILY PROGRESS NOTE  [facility]    Patient Identification:  Name: Anatoliy Tinsley  Age: 86 y.o.  Sex: female  :  1935  MRN: [id number]         Primary Care Physician: Gildardo Crum APRN    Subjective:  Interval History: History and ROS is completely unreliable given her advanced dementia    Objective: Patient was actually nicer to me today and spoke a few words with older brother was at bedside    Scheduled Meds:calcium carbonate (oyster shell), 500 mg, Oral, Daily  ceFAZolin, 2 g, Intravenous, Q8H  metoprolol succinate XL, 25 mg, Oral, Daily      Continuous Infusions:     Vital signs in last 24 hours:  Temp:  [97.1 °F (36.2 °C)-98 °F (36.7 °C)] 98 °F (36.7 °C)  Heart Rate:  [65-75] 65  Resp:  [16-18] 16  BP: (106-125)/(58-86) 125/86    Intake/Output:    Intake/Output Summary (Last 24 hours) at 2022 1043  Last data filed at 2022 0454  Gross per 24 hour   Intake 120 ml   Output 1500 ml   Net -1380 ml       Exam:  /86 (BP Location: Left arm, Patient Position: Lying)   Pulse 65   Temp 98 °F (36.7 °C) (Axillary) Comment (Src): PT REFUSING ORAL TEMP  Resp 16   Ht 160 cm (63\")   Wt 63 kg (139 lb)   SpO2 96%   BMI 24.62 kg/m²     General Appearance:    Alert, more cooperative today, terminally demented                          Head:    Normocephalic, without obvious abnormality, atraumatic                           Eyes:    PERRL, conjunctivae/corneas clear, EOM's intact, both eyes                         Throat:   Oral mucosa pink and moist                           Neck:   No JVD                         Lungs:    Clear to auscultation bilaterally, respirations unlabored                 Chest Wall:    No tenderness or deformity                          Heart:    Regular rate and rhythm, S1 and S2 normal                  Abdomen:     Soft, nontender, bowel sounds active                 Extremities:   No cyanosis or edema                  Neurologic:   CNII-XII intact   "   Data Review:  Labs in chart were reviewed.    Assessment:  Active Hospital Problems    Diagnosis  POA   • **Cellulitis of left lower extremity [L03.116]  Yes   • Failure to thrive in adult [R62.7]  Yes   • Bacteremia due to Streptococcus [R78.81, B95.5]  Yes   • MSSA bacteremia [R78.81, B95.61]  Yes   • Osteoporosis [M81.0]  Yes   • Paroxysmal atrial fibrillation (HCC) [I48.0]  Yes   • Mixed hyperlipidemia [E78.2]  Yes      Resolved Hospital Problems   No resolved problems to display.       Plan:    IV cefazolin for MSSA bacteremia secondary to left lower extremity cellulitis per ID    Long conversation with brother at bedside.  He is a great asset and very helpful in this case.  Apparently he had talked to orthopedics much earlier in the week but unfortunately there was no note placed in the chart to convey that to me.  No surgical plans endorsed and I support that.    >30m spent counseling and coordinating care.  Unfortunately patient was not quite successful in [place] and upon moving here due to further functional decline status post alf, she did not save any money and ultimately only has so security at this point.  The brother was nice enough to move the patient in his own home but that did not work out and he has subsequently tried 2 different assisted living facilities with the most recent being [facility].  He is still holding that bad at this time and have asked him to discuss this further with [facility] Brii Danielson as he may need to go ahead and discontinue that room as I do not see this patient returning to a level of assisted living.  At this point in time we are implementing a modified palliative care plan.  I counseled him that I wish we could have change goals of care much earlier given the fact that she had bacteremia but we have already pushed that infection back quite a bit and of also counseled him that infection is good to be the cause of death in the next form of infection should likely  "not be treated.  He seems to understand and accept all.  At this point in time we will implement the palliative care order set and see what kind of requirements this patient takes in over the next couple days.  If we are able to keep your as a skilled bed and continue to palliate then we can consider that but if patient remains overall \"stable\" that we may have to consider transition to an outlying facility that can do palliative care as well.  Again Case discussed at length with CCP and we will see how this clinical case unfolds.    Ángel Cortez MD  12/5/2022  10:43 EST    "

## 2022-12-05 NOTE — PLAN OF CARE
Goal Outcome Evaluation:               VSS. Tylenol x 1. Pt refuses turns often; waffle cushion under bottom. Remains confused; intermittently cooperative.

## 2022-12-05 NOTE — PLAN OF CARE
Goal Outcome Evaluation:  Plan of Care Reviewed With: patient, other (see comments) (brother)        Progress: declining  Outcome Evaluation: Pt alert but very confused. Pt irritated with activity. FC in place. Medicated x2 with 2mg morphine and 1mg ativan. Pts brother (POA) would like to keep her comfortable. Resting comfortably following comfort meds. Will continue comfort measures.

## 2022-12-06 NOTE — DISCHARGE PLACEMENT REQUEST
"Anatoliy Tinsley (86 y.o. Female)     Date of Birth   1935    Social Security Number       Address   [address] [place] [address]    Home Phone   [phone]    MRN   [id number]       Confucianist   None    Marital Status   Single                            Admission Date   11/27/22    Admission Type   Emergency    Admitting Provider   Kasandra Shukla MD    Attending Provider   Ángel Cortez MD    Department, Room/Bed   [address], [address]479/1       Discharge Date       Discharge Disposition       Discharge Destination                               Attending Provider: Ángel Cortez MD    Allergies: Monosodium Glutamate, Lipitor [Atorvastatin Calcium], Sugar-protein-starch, Atorvastatin, Brimonidine, Corn Syrup, Diltiazem, Soy Allergy, Tilactase, Wheat Bran    Isolation: None   Infection: None   Code Status: No CPR    Ht: 160 cm (63\")   Wt: 63 kg (139 lb)    Admission Cmt: None   Principal Problem: Cellulitis of left lower extremity [L03.116]                 Active Insurance as of 11/27/2022     Primary Coverage     Payor Plan Insurance Group Employer/Plan Group    [facility] MEDICARE REPLACEMENT [facility] MEDICARE REPLACEMENT [id number]     Payor Plan Address Payor Plan Phone Number Payor Plan Fax Number Effective Dates    [address] [address]   10/1/2020 - None Entered    [facility] [id number]       Subscriber Name Subscriber Birth Date Member ID       ANATOLIY TINSLEY 1935 [id number]                 Emergency Contacts      (Rel.) Home Phone Work Phone Mobile Phone    (POA) DEREK TINSLEY (Brother) -- -- [phone]              "

## 2022-12-06 NOTE — PLAN OF CARE
Goal Outcome Evaluation:  Plan of Care Reviewed With: patient        Progress: declining  Outcome Evaluation: Pt sleeping most of day--refused to eat meals. Medicated x1 with 2mg morphine and 0.5mg ativan for pain and agitation. Brother visited at bedside. Will cont to monitor

## 2022-12-06 NOTE — PROGRESS NOTES
"    DAILY PROGRESS NOTE  [facility]    Patient Identification:  Name: Anatoliy Tinsley  Age: 86 y.o.  Sex: female  :  1935  MRN: [id number]         Primary Care Physician: Gildardo Crum APRN    Subjective:  Interval History: History and ROS unobtainable from the patient given his current sedation.  Brother at bedside and case was discussed at length    Objective: This is the first time I have seen this patient actually relaxed and nonagitated    Scheduled Meds:   Continuous Infusions:     Vital signs in last 24 hours:  Temp:  [97 °F (36.1 °C)-97.6 °F (36.4 °C)] 97 °F (36.1 °C)  Heart Rate:  [92-98] 92  Resp:  [18-22] 18  BP: (118-156)/(62-81) 118/62    Intake/Output:    Intake/Output Summary (Last 24 hours) at 2022 1006  Last data filed at 2022 0445  Gross per 24 hour   Intake 0 ml   Output 775 ml   Net -775 ml       Exam:  /62 (BP Location: Left arm, Patient Position: Lying)   Pulse 92   Temp 97 °F (36.1 °C) (Oral)   Resp 18   Ht 160 cm (63\")   Wt 63 kg (139 lb)   SpO2 92%   BMI 24.62 kg/m²     General Appearance:    Relaxed and resting                         Throat:   Oral mucosa pink and moist                         Lungs:    Clear to auscultation bilaterally, respirations unlabored                          Heart:    Regular rate and rhythm, S1 and S2 normal                  Abdomen:     Soft, nontender, bowel sounds active                 Extremities:   Resolving cellulitis left lower extremity    Data Review:  Labs in chart were reviewed.    Assessment:  Active Hospital Problems    Diagnosis  POA   • **Cellulitis of left lower extremity [L03.116]  Yes   • Failure to thrive in adult [R62.7]  Yes   • Bacteremia due to Streptococcus [R78.81, B95.5]  Yes   • MSSA bacteremia [R78.81, B95.61]  Yes   • Osteoporosis [M81.0]  Yes   • Paroxysmal atrial fibrillation (HCC) [I48.0]  Yes   • Mixed hyperlipidemia [E78.2]  Yes      Resolved Hospital Problems   No resolved problems " to display.       Plan:    Continue current palliative plan    Antibiotics discontinued    Patient definitely more relaxed today with the assistance of medications to help with her behavior disorder/dementia    Case discussed at length with brother/POA at bedside    Likely can reconsult hospice in the next day or so for HSB pending symptomatic requirements    Appreciate CCP assistance    Ángel Cortez MD  12/6/2022  10:06 EST

## 2022-12-06 NOTE — PLAN OF CARE
Goal Outcome Evaluation:  Plan of Care Reviewed With: patient        Progress: declining  Outcome Evaluation: Patient less alert overnight, given one dose of 2mg IV morphine due to yelling out during repositioning and patient appeared more comfortable. Patient slept throughout shift, only awakening during repositioning. FC intact, RA, no visitors overnight, will continue to monitor.

## 2022-12-07 NOTE — PLAN OF CARE
Goal Outcome Evaluation:  Plan of Care Reviewed With: patient        Progress: no change  Outcome Evaluation: Patient slept throughout shift except for middle of night became somewhat restless, given 4mg IV morphine and 1mg IV Ativan with improvement in symptoms. Patient repositioned every 4 hours for comfort. FC intact, no signs of labored breathing. Patient answers to names but remains confused. No visitors overnight, will continue comfort measures.

## 2022-12-07 NOTE — H&P
HISTORY AND PHYSICAL   [facility]        Date of Admission: 2022  Patient Identification:  Name: Anatoliy Tinsley  Age: 86 y.o.  Sex: female  :  1935  MRN: [id number]                     Primary Care Physician: Gildardo Crum APRN    Chief Complaint: Pain/agitation with dementia    History of Present Illness:   Ms Tinsley is a 86-year-old female who formally lived [place] west in [place] and ultimately did not have long-term planning regarding finances per my discussion with the POA who was that her brother.  He was kind enough to bring her out to this area and ultimately had to transition her into assisted living though patient Clinically getting worse in regards to her dementia and falls history.  She was found to have left lower extremity cellulitis with subsequent MSSA bacteremia on this admission and was seen in consultation by infectious disease.  She was treated with IV cefazolin though once we transition goals of care to comfort, antibiotics were discontinued.  Orthopedics saw patient in regards to periprosthetic fracture of her right proximal femur as well as right olecranon fracture and no surgical repair was endorsed.  After further discussion with the brother, it was very clear that he wanted to proceed with full comfort care.  I implemented the palliative care order set on 2022 and since then the patient has been much more comfortable in regards to her agitation and complaints of pain.  She is definitely meeting the kiran in regards to symptomatic treatment and I have placed a hospice consult and empirically for this patient over to hospice scattered bed today.  Case has been discussed closely with the brother/POA.    Past Medical History:  Past Medical History:   Diagnosis Date   • Atrial fibrillation (HCC)    • Atrial fibrillation (HCC)    • Dementia (HCC)    • Osteoporosis      Past Surgical History:  Past Surgical History:   Procedure Laterality Date   • CAROTID  ENDARTERECTOMY     • HIP SURGERY     • TONSILLECTOMY        Home Meds:  Medications Prior to Admission   Medication Sig Dispense Refill Last Dose   • alendronate (Fosamax) 70 MG tablet Take 1 tablet by mouth Every 7 (Seven) Days. 4 tablet 11 11/26/2022   • apixaban (Eliquis) 2.5 MG tablet tablet Take 1 tablet by mouth Every 12 (Twelve) Hours. 180 tablet 4 11/26/2022   • calcium carbonate-vitamin d (Calcium 600+D) 600-400 MG-UNIT per tablet Take 1 tablet by mouth Daily.   11/26/2022   • ezetimibe (ZETIA) 10 MG tablet TAKE ONE TABLET BY MOUTH DAILY 90 tablet 1 11/26/2022   • MAGNESIUM PO Take  by mouth.   11/26/2022   • metoprolol succinate XL (TOPROL-XL) 25 MG 24 hr tablet Take 1 tablet by mouth Daily. 30 tablet 11 11/26/2022       Allergies:  Allergies   Allergen Reactions   • Monosodium Glutamate Shortness Of Breath     Difficulty breathing, swallowing, effect her logic,    • Lipitor [Atorvastatin Calcium] Itching   • Sugar-Protein-Starch Dizziness     sensitive   • Atorvastatin Other (See Comments)     myalgias   • Brimonidine Other (See Comments)     Altered sensorium with disorientation (CNS depression)   • Corn Syrup Itching   • Diltiazem Dizziness     Lightheadedness   • Soy Allergy GI Intolerance     Nausea weak    • Tilactase GI Intolerance   • Wheat Bran GI Intolerance     Immunizations:  Immunization History   Administered Date(s) Administered   • COVID-19 (PFIZER) PURPLE CAP 02/20/2021, 03/13/2021, 10/29/2021   • Covid-19 (Pfizer) Gray Cap 06/15/2022   • Flu Vaccine Split Quad 02/25/2014   • Fluad Quad 65+ 10/21/2020   • Fluzone High Dose =>65 Years ([facility] ONLY) 01/25/2017   • PPD Test 03/27/1996, 10/29/2004, 10/24/2008   • Pneumococcal Conjugate 13-Valent (PCV13) 05/27/2016, 08/18/2017   • Pneumococcal Polysaccharide (PPSV23) 02/25/2004   • TD Preservative Free 02/25/2004   • Td 02/25/2014   • Tdap 03/13/2015, 08/28/2018, 03/05/2020     Social History:   Social History     Social History Narrative   •  "Not on file     Social History     Socioeconomic History   • Marital status: Single   Tobacco Use   • Smoking status: Never   • Smokeless tobacco: Never   Vaping Use   • Vaping Use: Never used   Substance and Sexual Activity   • Alcohol use: Yes     Comment: may 2 times a year/caffeine use    • Drug use: Defer   • Sexual activity: Defer       Family History:  Family History   Problem Relation Age of Onset   • Heart disease Mother    • Other Mother         low blood sugar   • Heart attack Mother    • Diabetes Father    • Heart attack Father    • Diabetes Brother    • Heart disease Brother    • Heart disease Brother    • Cancer Brother    • Breast cancer Neg Hx    • Colon cancer Neg Hx         Review of Systems  See history of present illness and past medical history.  Unobtainable.    Objective:  T Max 24 hrs: Temp (24hrs), Av.4 °F (36.3 °C), Min:96.8 °F (36 °C), Max:97.9 °F (36.6 °C)    Vitals Ranges:   Temp:  [96.8 °F (36 °C)-97.9 °F (36.6 °C)] 96.8 °F (36 °C)  Heart Rate:  [132-147] 132  Resp:  [18] 18  BP: (123)/(80) 123/80      Exam:  /80 (BP Location: Left arm, Patient Position: Lying)   Pulse (!) 132   Temp 96.8 °F (36 °C) (Oral)   Resp 18   Ht 160 cm (63\")   Wt 63 kg (139 lb)   SpO2 (!) 84%   BMI 24.62 kg/m²     General Appearance:    Comfortable and sedated           Ears:    Normal external ear canals, both ears   Nose:   Nares normal, septum midline, mucosa normal, no drainage    or sinus tenderness   Throat:   Lips, mucosa, and tongue normal   Neck:   Supple, no JVD       Lungs:    Decreased bases otherwise clear to auscultation bilaterally, respirations unlabored   Chest Wall:    No tenderness or deformity    Heart:    Regular rate and rhythm, S1 and S2 normal   Abdomen:     Soft, nontender, bowel sounds active all four quadrants,   Corona with cavazos urine   Extremities:  Did not physically manipulate lower extremity secondary to fracture, no cyanosis or edema   Pulses:   2+ and " symmetric all extremities   Skin:   No mottling       Neurologic:  Unable to fully evaluate at this time      .    Data Review:  Labs in chart were reviewed.             Imaging Results (All)     Procedure Component Value Units Date/Time    CT Lower Extremity Right Without Contrast [[id number]] Collected: 11/29/22 1242     Updated: 11/29/22 1306    Narrative:      RIGHT HIP CT WITHOUT CONTRAST     HISTORY: Mechanical fall. Hip pain and difficulty bearing weight. X-rays  demonstrated findings suspicious for proximal femoral periprosthetic  fracture.     TECHNIQUE: Axial right hip CT was performed on 11/27/2022. Multiplanar  reformatted images were produced. Due to a technical problem, the images  were never forwarded from the CT data base to the PACS system for  radiologist review. The images were submitted at around 12 noon on  11/29/2022.     Radiation dose reduction techniques were utilized, including automated  exposure control and exposure modulation based on body size.     FINDINGS: There is noncemented right total hip arthroplasty hardware. An  acute, comminuted, nondisplaced proximal femoral diametaphysis fracture  extends obliquely from the lateral femoral cortex at about the level of  the lesser trochanter and a oblique fashion across the proximal  diametaphysis, terminating at the cortex anteromedially about 5 cm above  the tip of the femoral stem. The axial images show cephalad extension  through the greater trochanter.     The visualized bones and the pelvis appear intact. The acetabular cup is  fixed with three screws; there is no evidence of loosening or liner  wear.     Mild deep soft tissue edema is observed around the proximal femur. No  significant hematoma. No intrapelvic free fluid.       Impression:      Acute, nondisplaced, periprosthetic proximal right femur  fracture.     This report was finalized on 11/29/2022 1:03 PM by Dr. Agus Umanzor M.D.       XR Hip With or Without Pelvis 2 - 3 View  Right [[id number]] Collected: 11/27/22 1233     Updated: 11/27/22 1239    Narrative:      RIGHT HIP WITH PELVIS, 3 VIEWS     HISTORY: Fall, right hip pain     COMPARISON: None available     FINDINGS: There is a right hip arthroplasty in place. There is no  evidence of a prosthetic dislocation. There is a lucency through the  medial cortex of the proximal right femur adjacent to the femoral stem  of the prosthesis seen on only one image but is suspicious for possible  periprosthetic fracture. A CT could be performed to better evaluate. A  left hip arthroplasty is also noted       Impression:      Lucency through the medial cortex of the proximal right femur adjacent  to the femoral stem of right hip prosthesis suspicious for possible  periprosthetic fracture. A CT could be performed to better evaluate     This report was finalized on 11/27/2022 12:36 PM by Dr. Geoffrey Mclaughlin M.D.       XR Elbow 2 View Right [[id number]] Collected: 11/27/22 1106     Updated: 11/27/22 1111    Narrative:      RIGHT ELBOW, 2 VIEWS     HISTORY: Fall, right elbow pain     COMPARISON: None available     FINDINGS: There is a fracture of the olecranon process of the ulna with  about 1.5 cm of displacement. There is an associated elbow joint  effusion. There does not appear to be a dislocation of the elbow joint.       Impression:      Fracture of the olecranon process of the ulna     This report was finalized on 11/27/2022 11:08 AM by Dr. Geoffrey Mclaughlin M.D.       CT Head Without Contrast [[id number]] Collected: 11/27/22 1058     Updated: 11/27/22 1105    Narrative:      HEAD CT WITHOUT CONTRAST     HISTORY: Head trauma     TECHNIQUE: Radiation dose reduction techniques were utilized, including  automated exposure control and exposure modulation based on body size.  Axial images were obtained from the skull base to vertex without IV  contrast.      COMPARISON: 11/12/2022     FINDINGS:  No evidence of intracranial hemorrhage, acute  cortical-based infarction,  focal mass lesion or hydrocephalus. Stable chronic white matter changes.  Carotid siphon calcification. Small scalp hematoma left frontal region.  Opacification of multiple ethmoid air cells. Orbits appear unremarkable.  Calvarium intact.       Impression:      No acute intracranial abnormality     Radiation dose reduction techniques were utilized, including automated  exposure control and exposure modulation based on body size.     This report was finalized on 11/27/2022 11:01 AM by Dr. Geoffrey Mclaughlin M.D.               Assessment:  Active Hospital Problems    Diagnosis  POA   • **Cellulitis of left lower extremity [L03.116]  Yes   • Failure to thrive in adult [R62.7]  Yes   • Bacteremia due to Streptococcus [R78.81, B95.5]  Yes   • MSSA bacteremia [R78.81, B95.61]  Yes   • Osteoporosis [M81.0]  Yes   • Paroxysmal atrial fibrillation (HCC) [I48.0]  Yes   • Mixed hyperlipidemia [E78.2]  Yes      Resolved Hospital Problems   No resolved problems to display.       Plan:    Flip patient towards hospice scatter bed status today and continue full palliative care    Case discussed with brother/POA at [phone]    Ángel Cortez MD  12/7/2022  09:15 EST

## 2022-12-07 NOTE — PROGRESS NOTES
Discharge Planning Assessment  [facility]     Patient Name: Anatoliy Tinsley  MRN: [id number]  Today's Date: 12/7/2022    Admit Date: 11/27/2022    Plan: Palliative pending   Discharge Needs Assessment    No documentation.                Discharge Plan     Row Name 12/07/22 1629       Plan    Plan Comments Spoke with Edgar and [facility] will meet on 12/8/22 around 16:00 to sign consents for a [facility] scattered bed. CCP will continue to follow for any needs that may arise. JOSELIN Danielson RN CCP.              Continued Care and Services - Admitted Since 11/27/2022    Coordination has not been started for this encounter.     Selected Continued Care - Episodes Includes continued care and service providers with selected services from the active episodes listed below    High Risk Care Management Episode start date: 11/7/2022   There are no active outsourced providers for this episode.               Expected Discharge Date and Time     Expected Discharge Date Expected Discharge Time    Dec 7, 2022          Demographic Summary    No documentation.                Functional Status    No documentation.                Psychosocial    No documentation.                Abuse/Neglect    No documentation.                Legal    No documentation.                Substance Abuse    No documentation.                Patient Forms    No documentation.                   Brii Danielson, RN

## 2022-12-07 NOTE — PLAN OF CARE
Goal Outcome Evaluation:           Progress: declining  Outcome Evaluation: patient medicated prior to turns with morphine and ativan. patient changed to dilaudid this evening with better pain results. brother at bedisde this morning, no new questions. plan to flip to hospice tomorrow. will continue to monitor patient for comfort.

## 2022-12-08 NOTE — PLAN OF CARE
Problem: Adult Inpatient Plan of Care  Goal: Plan of Care Review  Outcome: Ongoing, Progressing  Flowsheets  Taken 12/8/2022 0335 by Yesi Lawrence, RN  Progress: no change  Outcome Evaluation: Patient minimally responsive. Premedicating prior to turns with 1mg of Dilaudid and 1mg of Ativan. Turning Q4 for comfort. Will continue to monitor for s/sx of discomfort.  Taken 12/7/2022 0440 by Adilene West, RN  Plan of Care Reviewed With: patient   Goal Outcome Evaluation:           Progress: no change  Outcome Evaluation: Patient minimally responsive. Premedicating prior to turns with 1mg of Dilaudid and 1mg of Ativan. Turning Q4 for comfort. Will continue to monitor for s/sx of discomfort.

## 2022-12-08 NOTE — PROGRESS NOTES
Discharge Planning Assessment  [facility]     Patient Name: Anatoliy Tinsley  MRN: [id number]  Today's Date: 2022    Admit Date: 2022    Plan: Palliative pending   Discharge Needs Assessment    No documentation.                Discharge Plan     Row Name 22 1543       Plan    Plan Comments The patient  on 22 @ 12:25. Spoke with the patient and he would like [facility] to  his sister body for cremation. He will meet with them at 11:00 AM tomorrow. Edgar/Brother met with [facility] and the patient  before she could be admitted to a [facility] scattered bed. JOSELIN Danielson RN CCP    Final Discharge Disposition Code 20 -     Final Note The patient  on 22 @ 12:25. JOSELIN Danielson RN, CCP.              Continued Care and Services - Discharged on 2022 Admission date: 2022 - Discharge disposition:    Coordination has not been started for this encounter.     Selected Continued Care - Episodes Includes continued care and service providers with selected services from the active episodes listed below    High Risk Care Management Episode start date: 2022   There are no active outsourced providers for this episode.               Expected Discharge Date and Time     Expected Discharge Date Expected Discharge Time    Dec 8, 2022          Demographic Summary    No documentation.                Functional Status    No documentation.                Psychosocial    No documentation.                Abuse/Neglect    No documentation.                Legal    No documentation.                Substance Abuse    No documentation.                Patient Forms    No documentation.                   Brii Danielson RN

## 2022-12-08 NOTE — PROGRESS NOTES
Case Management Discharge Note      Final Note: The patient  on 22 @ 12:25. BZaina Danielson RN, CCP.         Selected Continued Care - Discharged on 2022 Admission date: 2022 - Discharge disposition:     Destination    No services have been selected for the patient.              Durable Medical Equipment    No services have been selected for the patient.              Dialysis/Infusion    No services have been selected for the patient.              Home Medical Care    No services have been selected for the patient.              Therapy    No services have been selected for the patient.              Community Resources    No services have been selected for the patient.              Community & DME    No services have been selected for the patient.                Selected Continued Care - Episodes Includes continued care and service providers with selected services from the active episodes listed below    High Risk Care Management Episode start date: 2022   There are no active outsourced providers for this episode.                    Final Discharge Disposition Code: 20 -

## 2022-12-11 NOTE — PROGRESS NOTES
"Enter Query Response Below      Query Response:     Dementia with behavior disorder         If applicable, please update the problem list.        Patient: Anatoliy Tinsley        : 1935  Account: [id number]           Admit Date: 2022        How to Respond to this query:       a. Click New Note     b. Answer query within the yellow box.                c. Update the Problem List, if applicable.      If you have any questions about this query contact me at: [email or web address]     Dr. Cortez,    Patient presented on  after a fall.  Patient was determined to have left lower extremity cellulitis and blood cultures were positive for MSSA.  Discharge summary includes progressive dementia with worsening behavior disorder and \"cellulitis/septicemia.\"  Patient was treated with:  IV fluids  IV Zosyn   IV vancomycin   IV ceftriaxone  -   IV cefazolin  -   Progress notes dated  and  include, \"metabolic encephalopathy due to infection.\"      Can this be further specified as:    - metabolic encephalopathy due to sepsis  - metabolic encephalopathy not due to sepsis  - progressive dementia only, patient did not have metabolic encephalopathy  - other _________________________  - clinically indeterminable    By submitting this query, we are merely seeking further clarification of documentation to accurately reflect all conditions that you are monitoring, evaluating, treating or that extend the hospitalization or utilize additional resources of care. Please utilize your independent clinical judgment when addressing the question(s) above.     This query and your response, once completed, will be entered into the legal medical record.    Sincerely,  Lotus Sinclair RN CCDS [facility]  Clinical Documentation Integrity Program   "